# Patient Record
Sex: FEMALE | Race: BLACK OR AFRICAN AMERICAN | NOT HISPANIC OR LATINO
[De-identification: names, ages, dates, MRNs, and addresses within clinical notes are randomized per-mention and may not be internally consistent; named-entity substitution may affect disease eponyms.]

---

## 2017-03-08 DIAGNOSIS — Z79.01 LONG TERM (CURRENT) USE OF ANTICOAGULANTS: ICD-10-CM

## 2017-03-08 RX ORDER — FLUTICASONE PROPIONATE 50 UG/1
50 SPRAY, METERED NASAL
Refills: 0 | Status: ACTIVE | COMMUNITY

## 2017-03-08 RX ORDER — ATORVASTATIN CALCIUM 40 MG/1
40 TABLET, FILM COATED ORAL
Refills: 0 | Status: ACTIVE | COMMUNITY

## 2017-03-08 RX ORDER — ALBUTEROL 90 MCG
90 AEROSOL (GRAM) INHALATION
Refills: 0 | Status: ACTIVE | COMMUNITY

## 2017-03-23 ENCOUNTER — APPOINTMENT (OUTPATIENT)
Dept: INTERNAL MEDICINE | Facility: CLINIC | Age: 82
End: 2017-03-23

## 2017-05-22 ENCOUNTER — APPOINTMENT (OUTPATIENT)
Dept: GASTROENTEROLOGY | Facility: CLINIC | Age: 82
End: 2017-05-22

## 2017-06-27 ENCOUNTER — APPOINTMENT (OUTPATIENT)
Dept: GASTROENTEROLOGY | Facility: CLINIC | Age: 82
End: 2017-06-27

## 2018-03-14 ENCOUNTER — APPOINTMENT (OUTPATIENT)
Dept: ORTHOPEDIC SURGERY | Facility: CLINIC | Age: 83
End: 2018-03-14

## 2018-05-17 ENCOUNTER — OUTPATIENT (OUTPATIENT)
Dept: OUTPATIENT SERVICES | Facility: HOSPITAL | Age: 83
LOS: 1 days | Discharge: HOME | End: 2018-05-17

## 2018-05-18 LAB
GLUCOSE BLDC GLUCOMTR-MCNC: 81 MG/DL — SIGNIFICANT CHANGE UP (ref 70–99)
GLUCOSE BLDC GLUCOMTR-MCNC: 88 MG/DL — SIGNIFICANT CHANGE UP (ref 70–99)

## 2019-07-11 ENCOUNTER — OUTPATIENT (OUTPATIENT)
Dept: OUTPATIENT SERVICES | Facility: HOSPITAL | Age: 84
LOS: 1 days | Discharge: HOME | End: 2019-07-11

## 2020-07-13 ENCOUNTER — APPOINTMENT (OUTPATIENT)
Dept: HEART AND VASCULAR | Facility: CLINIC | Age: 85
End: 2020-07-13
Payer: MEDICARE

## 2020-07-13 PROCEDURE — 99443: CPT

## 2020-07-25 RX ORDER — LOSARTAN POTASSIUM 100 MG/1
100 TABLET, FILM COATED ORAL
Refills: 0 | Status: DISCONTINUED | COMMUNITY
End: 2020-07-25

## 2020-07-25 RX ORDER — METFORMIN HYDROCHLORIDE 500 MG/1
500 TABLET, COATED ORAL
Refills: 0 | Status: DISCONTINUED | COMMUNITY
End: 2020-07-25

## 2020-07-25 RX ORDER — TRIAMTERENE AND HYDROCHLOROTHIAZIDE 37.5; 25 MG/1; MG/1
37.5-25 CAPSULE ORAL
Refills: 0 | Status: DISCONTINUED | COMMUNITY
End: 2020-07-25

## 2020-07-25 RX ORDER — METOPROLOL SUCCINATE 50 MG/1
50 TABLET, EXTENDED RELEASE ORAL
Refills: 0 | Status: DISCONTINUED | COMMUNITY
End: 2020-07-25

## 2020-07-25 RX ORDER — OXYBUTYNIN CHLORIDE 5 MG/1
5 TABLET ORAL
Refills: 0 | Status: DISCONTINUED | COMMUNITY
End: 2020-07-25

## 2020-07-25 RX ORDER — APIXABAN 2.5 MG/1
2.5 TABLET, FILM COATED ORAL
Qty: 60 | Refills: 5 | Status: ACTIVE | COMMUNITY
Start: 2020-07-25

## 2020-07-25 RX ORDER — FUROSEMIDE 40 MG/1
40 TABLET ORAL
Refills: 0 | Status: DISCONTINUED | COMMUNITY
End: 2020-07-25

## 2020-07-25 RX ORDER — ASPIRIN 81 MG
81 TABLET, DELAYED RELEASE (ENTERIC COATED) ORAL
Refills: 0 | Status: DISCONTINUED | COMMUNITY
End: 2020-07-25

## 2020-07-25 RX ORDER — AMLODIPINE BESYLATE 5 MG/1
5 TABLET ORAL DAILY
Qty: 30 | Refills: 0 | Status: ACTIVE | COMMUNITY
Start: 2020-07-25

## 2020-07-25 RX ORDER — WARFARIN 5 MG/1
5 TABLET ORAL
Refills: 0 | Status: DISCONTINUED | COMMUNITY
End: 2020-07-25

## 2020-07-25 NOTE — DISCUSSION/SUMMARY
[FreeTextEntry1] : Ms. Medina is an 85 year-old female with diastolic heart failure, hypertension, pulmonary hypertension, pulmonary fibrosis, hyperlipidemia, sleep apnea, and persistent atrial fibrillation who suffers from profound WEN and PND.  It is unclear to me how much her symptoms can be attributed to her AF and how much can be stems from her heart failure and pulmonary fibrosis.  I have asked to consider DC cardioversion to evaluate for improvement in NSR.  She remains reluctant and states she is "fine the way she is".  She is agreeable to see me in person so we can discuss her options further along with obtaining an EKG to ascertain her level of rate control .

## 2020-07-25 NOTE — REVIEW OF SYSTEMS
[Dyspnea on exertion] : dyspnea during exertion [Chest Pain] : no chest pain [Shortness Of Breath] : no shortness of breath [Palpitations] : no palpitations [Cough] : cough [Lower Ext Edema] : lower extremity edema [Negative] : Genitourinary

## 2020-07-25 NOTE — HISTORY OF PRESENT ILLNESS
[FreeTextEntry1] : Ms. Medina is an 85 year-old female with diastolic heart failure, hypertension, pulmonary hypertension, pulmonary fibrosis, hyperlipidemia, sleep apnea, and persistent atrial fibrillation who presents today to discuss the merits of a rhythm control strategy.  She is unclear when she was first diagnosed with atrial fibrillation and has never had a cardioversion or ablation.  She denies any palpitations, chest pain, SOB, or syncope.  However, she does endorse significant WEN and is able to ambulate only one block with the aid of a walker.  She states she is limited by dyspnea and also chronic right hip pain.  In addition, she endorses PND and mild orthopnea.  She denies any history of stroke and has been maintained on Eliquis 2.5mg twice daily for stroke prophylaxis.  There have been no bleeding issues.

## 2020-07-25 NOTE — REASON FOR VISIT
[Medical Office: ([facility])___] : at the medical office located in  [Verbal consent obtained from patient] : the patient, [unfilled] [Home] : at home, [unfilled] , at the time of the visit.

## 2020-09-14 ENCOUNTER — APPOINTMENT (OUTPATIENT)
Dept: VASCULAR SURGERY | Facility: CLINIC | Age: 85
End: 2020-09-14
Payer: MEDICARE

## 2020-09-14 PROCEDURE — 29580 STRAPPING UNNA BOOT: CPT | Mod: LT

## 2020-09-14 PROCEDURE — 99203 OFFICE O/P NEW LOW 30 MIN: CPT | Mod: 25

## 2020-09-15 NOTE — REVIEW OF SYSTEMS
[Lower Ext Edema] : lower extremity edema [Limb Swelling] : limb swelling [As Noted in HPI] : as noted in HPI [Negative] : Heme/Lymph [Fever] : no fever [Chills] : no chills [Leg Claudication] : no intermittent leg claudication

## 2020-09-15 NOTE — HISTORY OF PRESENT ILLNESS
[FreeTextEntry1] : 84 yo F with multiple medical issues including CHF, on diuretic who was referred by her PCP to be evaluated for bilateral LE edema and LLE wiping ulcers. Patient states that she developed ulcerations 2 months ago and they haven't been healing since then. She has a visiting nurse who applies bacitracin and wraps her leg. She denies claudication, fever, chills, pain in her legs. Patient ambulates slowly with a walker.

## 2020-09-15 NOTE — PHYSICAL EXAM
[Normal Breath Sounds] : Normal breath sounds [Normal Rate and Rhythm] : normal rate and rhythm [2+] : left 2+ [Ankle Swelling (On Exam)] : present [Ankle Swelling On The Left] : moderate [Alert] : alert [] : bilaterally [Calm] : calm [JVD] : no jugular venous distention  [Varicose Veins Of Lower Extremities] : not present [Abdomen Tenderness] : ~T ~M No abdominal tenderness [de-identified] : WN/WD, NAD [de-identified] : supple [de-identified] : NC/AT [de-identified] : LLE: + hyperkeratotic changes due to venous stasis, + ulcer posterior calf, draining clear fluid [de-identified] : grossly intact

## 2020-09-15 NOTE — ASSESSMENT
[Ulcer Care] : ulcer care [FreeTextEntry1] : 84 yo F with multiple medical issues including CHF, on diuretic who was referred by her PCP to be evaluated for bilateral LE edema and LLE wiping ulcers.\par Venous stasis ulce and bilateral legs edema, L>R, no signs of infection.\par Patient was recommended unna boot that was strapped in the office.\par She will have wound care by [facility].\par F/u in 4 weeks.

## 2021-01-15 ENCOUNTER — APPOINTMENT (OUTPATIENT)
Dept: VASCULAR SURGERY | Facility: CLINIC | Age: 86
End: 2021-01-15

## 2023-11-09 ENCOUNTER — INPATIENT (INPATIENT)
Facility: HOSPITAL | Age: 88
LOS: 5 days | Discharge: HOME | DRG: 291 | End: 2023-11-15
Attending: STUDENT IN AN ORGANIZED HEALTH CARE EDUCATION/TRAINING PROGRAM | Admitting: HOSPITALIST
Payer: MEDICARE

## 2023-11-09 VITALS
SYSTOLIC BLOOD PRESSURE: 131 MMHG | DIASTOLIC BLOOD PRESSURE: 53 MMHG | TEMPERATURE: 98 F | HEART RATE: 70 BPM | HEIGHT: 60 IN | RESPIRATION RATE: 18 BRPM | OXYGEN SATURATION: 95 % | WEIGHT: 201.94 LBS

## 2023-11-09 DIAGNOSIS — I10 ESSENTIAL (PRIMARY) HYPERTENSION: ICD-10-CM

## 2023-11-09 DIAGNOSIS — Z90.710 ACQUIRED ABSENCE OF BOTH CERVIX AND UTERUS: Chronic | ICD-10-CM

## 2023-11-09 DIAGNOSIS — E11.9 TYPE 2 DIABETES MELLITUS WITHOUT COMPLICATIONS: ICD-10-CM

## 2023-11-09 DIAGNOSIS — N90.7 VULVAR CYST: ICD-10-CM

## 2023-11-09 DIAGNOSIS — I50.9 HEART FAILURE, UNSPECIFIED: ICD-10-CM

## 2023-11-09 DIAGNOSIS — J44.9 CHRONIC OBSTRUCTIVE PULMONARY DISEASE, UNSPECIFIED: ICD-10-CM

## 2023-11-09 DIAGNOSIS — I48.91 UNSPECIFIED ATRIAL FIBRILLATION: ICD-10-CM

## 2023-11-09 DIAGNOSIS — E78.5 HYPERLIPIDEMIA, UNSPECIFIED: ICD-10-CM

## 2023-11-09 DIAGNOSIS — N39.0 URINARY TRACT INFECTION, SITE NOT SPECIFIED: ICD-10-CM

## 2023-11-09 DIAGNOSIS — S81.809A UNSPECIFIED OPEN WOUND, UNSPECIFIED LOWER LEG, INITIAL ENCOUNTER: ICD-10-CM

## 2023-11-09 DIAGNOSIS — Z87.19 PERSONAL HISTORY OF OTHER DISEASES OF THE DIGESTIVE SYSTEM: Chronic | ICD-10-CM

## 2023-11-09 LAB
ALBUMIN SERPL ELPH-MCNC: 3.4 G/DL — SIGNIFICANT CHANGE UP (ref 3.3–5)
ALBUMIN SERPL ELPH-MCNC: 3.4 G/DL — SIGNIFICANT CHANGE UP (ref 3.3–5)
ALP SERPL-CCNC: 129 U/L — HIGH (ref 40–120)
ALP SERPL-CCNC: 129 U/L — HIGH (ref 40–120)
ALT FLD-CCNC: 14 U/L — SIGNIFICANT CHANGE UP (ref 10–45)
ALT FLD-CCNC: 14 U/L — SIGNIFICANT CHANGE UP (ref 10–45)
ANION GAP SERPL CALC-SCNC: 13 MMOL/L — SIGNIFICANT CHANGE UP (ref 5–17)
ANION GAP SERPL CALC-SCNC: 13 MMOL/L — SIGNIFICANT CHANGE UP (ref 5–17)
APPEARANCE UR: CLEAR — SIGNIFICANT CHANGE UP
APPEARANCE UR: CLEAR — SIGNIFICANT CHANGE UP
AST SERPL-CCNC: 22 U/L — SIGNIFICANT CHANGE UP (ref 10–40)
AST SERPL-CCNC: 22 U/L — SIGNIFICANT CHANGE UP (ref 10–40)
BACTERIA # UR AUTO: ABNORMAL /HPF
BACTERIA # UR AUTO: ABNORMAL /HPF
BASOPHILS # BLD AUTO: 0.03 K/UL — SIGNIFICANT CHANGE UP (ref 0–0.2)
BASOPHILS # BLD AUTO: 0.03 K/UL — SIGNIFICANT CHANGE UP (ref 0–0.2)
BASOPHILS NFR BLD AUTO: 0.5 % — SIGNIFICANT CHANGE UP (ref 0–2)
BASOPHILS NFR BLD AUTO: 0.5 % — SIGNIFICANT CHANGE UP (ref 0–2)
BILIRUB SERPL-MCNC: 0.8 MG/DL — SIGNIFICANT CHANGE UP (ref 0.2–1.2)
BILIRUB SERPL-MCNC: 0.8 MG/DL — SIGNIFICANT CHANGE UP (ref 0.2–1.2)
BILIRUB UR-MCNC: NEGATIVE — SIGNIFICANT CHANGE UP
BILIRUB UR-MCNC: NEGATIVE — SIGNIFICANT CHANGE UP
BUN SERPL-MCNC: 48 MG/DL — HIGH (ref 7–23)
BUN SERPL-MCNC: 48 MG/DL — HIGH (ref 7–23)
CALCIUM SERPL-MCNC: 9.5 MG/DL — SIGNIFICANT CHANGE UP (ref 8.4–10.5)
CALCIUM SERPL-MCNC: 9.5 MG/DL — SIGNIFICANT CHANGE UP (ref 8.4–10.5)
CAST: 5 /LPF — HIGH (ref 0–4)
CAST: 5 /LPF — HIGH (ref 0–4)
CHLORIDE SERPL-SCNC: 105 MMOL/L — SIGNIFICANT CHANGE UP (ref 96–108)
CHLORIDE SERPL-SCNC: 105 MMOL/L — SIGNIFICANT CHANGE UP (ref 96–108)
CO2 SERPL-SCNC: 26 MMOL/L — SIGNIFICANT CHANGE UP (ref 22–31)
CO2 SERPL-SCNC: 26 MMOL/L — SIGNIFICANT CHANGE UP (ref 22–31)
COLOR SPEC: YELLOW — SIGNIFICANT CHANGE UP
COLOR SPEC: YELLOW — SIGNIFICANT CHANGE UP
CREAT SERPL-MCNC: 1.43 MG/DL — HIGH (ref 0.5–1.3)
CREAT SERPL-MCNC: 1.43 MG/DL — HIGH (ref 0.5–1.3)
DIFF PNL FLD: NEGATIVE — SIGNIFICANT CHANGE UP
DIFF PNL FLD: NEGATIVE — SIGNIFICANT CHANGE UP
EGFR: 36 ML/MIN/1.73M2 — LOW
EGFR: 36 ML/MIN/1.73M2 — LOW
EOSINOPHIL # BLD AUTO: 0.09 K/UL — SIGNIFICANT CHANGE UP (ref 0–0.5)
EOSINOPHIL # BLD AUTO: 0.09 K/UL — SIGNIFICANT CHANGE UP (ref 0–0.5)
EOSINOPHIL NFR BLD AUTO: 1.4 % — SIGNIFICANT CHANGE UP (ref 0–6)
EOSINOPHIL NFR BLD AUTO: 1.4 % — SIGNIFICANT CHANGE UP (ref 0–6)
GLUCOSE SERPL-MCNC: 111 MG/DL — HIGH (ref 70–99)
GLUCOSE SERPL-MCNC: 111 MG/DL — HIGH (ref 70–99)
GLUCOSE UR QL: NEGATIVE MG/DL — SIGNIFICANT CHANGE UP
GLUCOSE UR QL: NEGATIVE MG/DL — SIGNIFICANT CHANGE UP
HCT VFR BLD CALC: 35.3 % — SIGNIFICANT CHANGE UP (ref 34.5–45)
HCT VFR BLD CALC: 35.3 % — SIGNIFICANT CHANGE UP (ref 34.5–45)
HGB BLD-MCNC: 11.2 G/DL — LOW (ref 11.5–15.5)
HGB BLD-MCNC: 11.2 G/DL — LOW (ref 11.5–15.5)
IMM GRANULOCYTES NFR BLD AUTO: 0.5 % — SIGNIFICANT CHANGE UP (ref 0–0.9)
IMM GRANULOCYTES NFR BLD AUTO: 0.5 % — SIGNIFICANT CHANGE UP (ref 0–0.9)
KETONES UR-MCNC: NEGATIVE MG/DL — SIGNIFICANT CHANGE UP
KETONES UR-MCNC: NEGATIVE MG/DL — SIGNIFICANT CHANGE UP
LEUKOCYTE ESTERASE UR-ACNC: ABNORMAL
LEUKOCYTE ESTERASE UR-ACNC: ABNORMAL
LYMPHOCYTES # BLD AUTO: 0.85 K/UL — LOW (ref 1–3.3)
LYMPHOCYTES # BLD AUTO: 0.85 K/UL — LOW (ref 1–3.3)
LYMPHOCYTES # BLD AUTO: 13.5 % — SIGNIFICANT CHANGE UP (ref 13–44)
LYMPHOCYTES # BLD AUTO: 13.5 % — SIGNIFICANT CHANGE UP (ref 13–44)
MAGNESIUM SERPL-MCNC: 1.6 MG/DL — SIGNIFICANT CHANGE UP (ref 1.6–2.6)
MAGNESIUM SERPL-MCNC: 1.6 MG/DL — SIGNIFICANT CHANGE UP (ref 1.6–2.6)
MCHC RBC-ENTMCNC: 31.4 PG — SIGNIFICANT CHANGE UP (ref 27–34)
MCHC RBC-ENTMCNC: 31.4 PG — SIGNIFICANT CHANGE UP (ref 27–34)
MCHC RBC-ENTMCNC: 31.7 GM/DL — LOW (ref 32–36)
MCHC RBC-ENTMCNC: 31.7 GM/DL — LOW (ref 32–36)
MCV RBC AUTO: 98.9 FL — SIGNIFICANT CHANGE UP (ref 80–100)
MCV RBC AUTO: 98.9 FL — SIGNIFICANT CHANGE UP (ref 80–100)
MONOCYTES # BLD AUTO: 0.75 K/UL — SIGNIFICANT CHANGE UP (ref 0–0.9)
MONOCYTES # BLD AUTO: 0.75 K/UL — SIGNIFICANT CHANGE UP (ref 0–0.9)
MONOCYTES NFR BLD AUTO: 11.9 % — SIGNIFICANT CHANGE UP (ref 2–14)
MONOCYTES NFR BLD AUTO: 11.9 % — SIGNIFICANT CHANGE UP (ref 2–14)
NEUTROPHILS # BLD AUTO: 4.54 K/UL — SIGNIFICANT CHANGE UP (ref 1.8–7.4)
NEUTROPHILS # BLD AUTO: 4.54 K/UL — SIGNIFICANT CHANGE UP (ref 1.8–7.4)
NEUTROPHILS NFR BLD AUTO: 72.2 % — SIGNIFICANT CHANGE UP (ref 43–77)
NEUTROPHILS NFR BLD AUTO: 72.2 % — SIGNIFICANT CHANGE UP (ref 43–77)
NITRITE UR-MCNC: NEGATIVE — SIGNIFICANT CHANGE UP
NITRITE UR-MCNC: NEGATIVE — SIGNIFICANT CHANGE UP
NRBC # BLD: 0 /100 WBCS — SIGNIFICANT CHANGE UP (ref 0–0)
NRBC # BLD: 0 /100 WBCS — SIGNIFICANT CHANGE UP (ref 0–0)
NT-PROBNP SERPL-SCNC: 4862 PG/ML — HIGH (ref 0–300)
NT-PROBNP SERPL-SCNC: 4862 PG/ML — HIGH (ref 0–300)
PH UR: 5.5 — SIGNIFICANT CHANGE UP (ref 5–8)
PH UR: 5.5 — SIGNIFICANT CHANGE UP (ref 5–8)
PLATELET # BLD AUTO: 165 K/UL — SIGNIFICANT CHANGE UP (ref 150–400)
PLATELET # BLD AUTO: 165 K/UL — SIGNIFICANT CHANGE UP (ref 150–400)
POTASSIUM SERPL-MCNC: 3.8 MMOL/L — SIGNIFICANT CHANGE UP (ref 3.5–5.3)
POTASSIUM SERPL-MCNC: 3.8 MMOL/L — SIGNIFICANT CHANGE UP (ref 3.5–5.3)
POTASSIUM SERPL-SCNC: 3.8 MMOL/L — SIGNIFICANT CHANGE UP (ref 3.5–5.3)
POTASSIUM SERPL-SCNC: 3.8 MMOL/L — SIGNIFICANT CHANGE UP (ref 3.5–5.3)
PROT SERPL-MCNC: 8.2 G/DL — SIGNIFICANT CHANGE UP (ref 6–8.3)
PROT SERPL-MCNC: 8.2 G/DL — SIGNIFICANT CHANGE UP (ref 6–8.3)
PROT UR-MCNC: NEGATIVE MG/DL — SIGNIFICANT CHANGE UP
PROT UR-MCNC: NEGATIVE MG/DL — SIGNIFICANT CHANGE UP
RBC # BLD: 3.57 M/UL — LOW (ref 3.8–5.2)
RBC # BLD: 3.57 M/UL — LOW (ref 3.8–5.2)
RBC # FLD: 14.6 % — HIGH (ref 10.3–14.5)
RBC # FLD: 14.6 % — HIGH (ref 10.3–14.5)
RBC CASTS # UR COMP ASSIST: 1 /HPF — SIGNIFICANT CHANGE UP (ref 0–4)
RBC CASTS # UR COMP ASSIST: 1 /HPF — SIGNIFICANT CHANGE UP (ref 0–4)
SODIUM SERPL-SCNC: 144 MMOL/L — SIGNIFICANT CHANGE UP (ref 135–145)
SODIUM SERPL-SCNC: 144 MMOL/L — SIGNIFICANT CHANGE UP (ref 135–145)
SP GR SPEC: 1.01 — SIGNIFICANT CHANGE UP (ref 1–1.03)
SP GR SPEC: 1.01 — SIGNIFICANT CHANGE UP (ref 1–1.03)
SQUAMOUS # UR AUTO: 4 /HPF — SIGNIFICANT CHANGE UP (ref 0–5)
SQUAMOUS # UR AUTO: 4 /HPF — SIGNIFICANT CHANGE UP (ref 0–5)
TROPONIN T, HIGH SENSITIVITY RESULT: 60 NG/L — CRITICAL HIGH (ref 0–51)
TROPONIN T, HIGH SENSITIVITY RESULT: 60 NG/L — CRITICAL HIGH (ref 0–51)
UROBILINOGEN FLD QL: 0.2 MG/DL — SIGNIFICANT CHANGE UP (ref 0.2–1)
UROBILINOGEN FLD QL: 0.2 MG/DL — SIGNIFICANT CHANGE UP (ref 0.2–1)
WBC # BLD: 6.29 K/UL — SIGNIFICANT CHANGE UP (ref 3.8–10.5)
WBC # BLD: 6.29 K/UL — SIGNIFICANT CHANGE UP (ref 3.8–10.5)
WBC # FLD AUTO: 6.29 K/UL — SIGNIFICANT CHANGE UP (ref 3.8–10.5)
WBC # FLD AUTO: 6.29 K/UL — SIGNIFICANT CHANGE UP (ref 3.8–10.5)
WBC UR QL: 44 /HPF — HIGH (ref 0–5)
WBC UR QL: 44 /HPF — HIGH (ref 0–5)

## 2023-11-09 PROCEDURE — 99223 1ST HOSP IP/OBS HIGH 75: CPT

## 2023-11-09 PROCEDURE — 99285 EMERGENCY DEPT VISIT HI MDM: CPT

## 2023-11-09 PROCEDURE — 93010 ELECTROCARDIOGRAM REPORT: CPT

## 2023-11-09 PROCEDURE — 71045 X-RAY EXAM CHEST 1 VIEW: CPT | Mod: 26

## 2023-11-09 RX ORDER — MAGNESIUM SULFATE 500 MG/ML
1 VIAL (ML) INJECTION ONCE
Refills: 0 | Status: COMPLETED | OUTPATIENT
Start: 2023-11-09 | End: 2023-11-09

## 2023-11-09 RX ORDER — POTASSIUM CHLORIDE 20 MEQ
20 PACKET (EA) ORAL ONCE
Refills: 0 | Status: COMPLETED | OUTPATIENT
Start: 2023-11-09 | End: 2023-11-09

## 2023-11-09 RX ORDER — APIXABAN 2.5 MG/1
2.5 TABLET, FILM COATED ORAL
Refills: 0 | Status: DISCONTINUED | OUTPATIENT
Start: 2023-11-09 | End: 2023-11-15

## 2023-11-09 RX ORDER — FUROSEMIDE 40 MG
40 TABLET ORAL ONCE
Refills: 0 | Status: COMPLETED | OUTPATIENT
Start: 2023-11-09 | End: 2023-11-09

## 2023-11-09 RX ORDER — FUROSEMIDE 40 MG
40 TABLET ORAL
Refills: 0 | Status: DISCONTINUED | OUTPATIENT
Start: 2023-11-09 | End: 2023-11-11

## 2023-11-09 RX ORDER — IPRATROPIUM/ALBUTEROL SULFATE 18-103MCG
3 AEROSOL WITH ADAPTER (GRAM) INHALATION EVERY 6 HOURS
Refills: 0 | Status: DISCONTINUED | OUTPATIENT
Start: 2023-11-09 | End: 2023-11-15

## 2023-11-09 RX ORDER — ACETAMINOPHEN 500 MG
1000 TABLET ORAL ONCE
Refills: 0 | Status: COMPLETED | OUTPATIENT
Start: 2023-11-09 | End: 2023-11-09

## 2023-11-09 RX ORDER — ATORVASTATIN CALCIUM 80 MG/1
40 TABLET, FILM COATED ORAL AT BEDTIME
Refills: 0 | Status: DISCONTINUED | OUTPATIENT
Start: 2023-11-09 | End: 2023-11-15

## 2023-11-09 RX ORDER — ATORVASTATIN CALCIUM 80 MG/1
1 TABLET, FILM COATED ORAL
Refills: 0 | DISCHARGE

## 2023-11-09 RX ORDER — CEFTRIAXONE 500 MG/1
1000 INJECTION, POWDER, FOR SOLUTION INTRAMUSCULAR; INTRAVENOUS ONCE
Refills: 0 | Status: COMPLETED | OUTPATIENT
Start: 2023-11-09 | End: 2023-11-09

## 2023-11-09 RX ORDER — ACETAMINOPHEN 500 MG
650 TABLET ORAL EVERY 6 HOURS
Refills: 0 | Status: DISCONTINUED | OUTPATIENT
Start: 2023-11-09 | End: 2023-11-10

## 2023-11-09 RX ORDER — ALBUTEROL 90 UG/1
2 AEROSOL, METERED ORAL EVERY 6 HOURS
Refills: 0 | Status: DISCONTINUED | OUTPATIENT
Start: 2023-11-09 | End: 2023-11-09

## 2023-11-09 RX ORDER — APIXABAN 2.5 MG/1
1 TABLET, FILM COATED ORAL
Refills: 0 | DISCHARGE

## 2023-11-09 RX ADMIN — APIXABAN 2.5 MILLIGRAM(S): 2.5 TABLET, FILM COATED ORAL at 17:57

## 2023-11-09 RX ADMIN — Medication 1 TABLET(S): at 17:57

## 2023-11-09 RX ADMIN — Medication 650 MILLIGRAM(S): at 23:26

## 2023-11-09 RX ADMIN — Medication 40 MILLIGRAM(S): at 09:12

## 2023-11-09 RX ADMIN — CEFTRIAXONE 100 MILLIGRAM(S): 500 INJECTION, POWDER, FOR SOLUTION INTRAMUSCULAR; INTRAVENOUS at 09:47

## 2023-11-09 RX ADMIN — Medication 650 MILLIGRAM(S): at 17:20

## 2023-11-09 RX ADMIN — Medication 400 MILLIGRAM(S): at 09:12

## 2023-11-09 RX ADMIN — Medication 1000 MILLIGRAM(S): at 09:48

## 2023-11-09 RX ADMIN — ATORVASTATIN CALCIUM 40 MILLIGRAM(S): 80 TABLET, FILM COATED ORAL at 21:25

## 2023-11-09 RX ADMIN — Medication 40 MILLIGRAM(S): at 21:25

## 2023-11-09 RX ADMIN — Medication 100 GRAM(S): at 16:20

## 2023-11-09 RX ADMIN — Medication 3 MILLILITER(S): at 21:25

## 2023-11-09 RX ADMIN — Medication 3 MILLILITER(S): at 16:20

## 2023-11-09 RX ADMIN — Medication 20 MILLIEQUIVALENT(S): at 17:57

## 2023-11-09 RX ADMIN — Medication 1 TABLET(S): at 16:20

## 2023-11-09 RX ADMIN — Medication 650 MILLIGRAM(S): at 16:20

## 2023-11-09 NOTE — H&P ADULT - PROBLEM SELECTOR PLAN 1
Overloaded on exam, +2 B/L LE edema, BNP 4862.  -EF unknown  -TTE tomorrow once euvolemic  -s/p IV lasix 40mg x 1 in the ED  -diuresis with IV lasix 40mg BID  -Core measures  -strict I&O's, daily weight, fluid restriction Overloaded on exam, +2 B/L LE edema, BNP 4862.  -EF unknown  -CXR reveals cardiomegaly with significant pulmonary congestion  -Trop T 60 (no need to trend per Dr. Wilson)  -TTE tomorrow once euvolemic  -s/p IV lasix 40mg x 1 in the ED  -diuresis with IV lasix 40mg BID  -Core measures  -strict I&O's, daily weight, fluid restriction

## 2023-11-09 NOTE — ED PROVIDER NOTE - PATIENT'S PREFERRED PRONOUN
From: Angela Rondon  To: Dr. Shen : 4/6/2023 9:16 AM EDT  Subject: Zulema Betancourt    Good morning! Was Bartolo Alvarado see if I could get Dr. Mike Matthews to send a prescription in to [facility] in [place], for me some nicotine patches, really want to start with my  and try to quit smoking, his dr put him on patches, thought we could try together! Thank you! Her/She

## 2023-11-09 NOTE — H&P ADULT - NSICDXPASTMEDICALHX_GEN_ALL_CORE_FT
PAST MEDICAL HISTORY:  Asthma     COPD, mild     Diabetes     Hypertension      PAST MEDICAL HISTORY:  (HFpEF) heart failure with preserved ejection fraction     Asthma     COPD, mild     Diabetes     Hypertension

## 2023-11-09 NOTE — H&P ADULT - NSHPLABSRESULTS_GEN_ALL_CORE
11.2   6.29  )-----------( 165      ( 2023 08:06 )             35.3           144  |  105  |  48<H>  ----------------------------<  111<H>  3.8   |  26  |  1.43<H>    Ca    9.5      2023 08:06  Mg     1.6         TPro  8.2  /  Alb  3.4  /  TBili  0.8  /  DBili  x   /  AST  22  /  ALT  14  /  AlkPhos  129<H>                  Urinalysis Basic - ( 2023 08:06 )    Color: Yellow / Appearance: Clear / S.006 / pH: x  Gluc: 111 mg/dL / Ketone: Negative mg/dL  / Bili: Negative / Urobili: 0.2 mg/dL   Blood: x / Protein: Negative mg/dL / Nitrite: Negative   Leuk Esterase: Moderate / RBC: 1 /HPF / WBC 44 /HPF   Sq Epi: x / Non Sq Epi: 4 /HPF / Bacteria: Many /HPF

## 2023-11-09 NOTE — CONSULT NOTE ADULT - ASSESSMENT
87yo P5 with PMHx of CHF and COPD presenting with painful lymphorrhea and painful swelling on right labia, suspected vulvar cyst or abscess.      -VSS, afebrile   -Pt to be admitted to medicine team for management of CHF and lymphorrhea   -S/p 1g CTX for suspected UTI   -Vulvar cyst/abscess suspected due to acute onset and tenderness. Finding of 1-2cm fluctuant mass at 7 o'clock.    -Recommend sitz baths up to 3x/day and warm compresses   -Recommend Bactrim 1 DS tablet BID x 7d    -Pt to follow up at [facility] at [address] [place] [address] (information below) for biopsy after discharge from hospital and completion of ABX      [facility]   [address] [id number] [phone]           Leonel Patterson PGY2   D/w Dr. Kam and Katy BARILLASC

## 2023-11-09 NOTE — ED ADULT NURSE REASSESSMENT NOTE - NS ED NURSE REASSESS COMMENT FT1
Assumed care of pt. 87 y/o F with pmhx obesity, AFib on warfarin, HTN, DM2, asthma, and chronic BLE edema in ED for worsening BLE edema with pain, right shin skin tear, and labial "boil". Pt voided to the bedpan and urine sent. Pt placed on primafit. Pt ordered for labs, IV lasix, and IV tylenol but is a difficult stick. Will administer meds after USIV placement-MD Millard made aware. Pt resting comfortably in bed.

## 2023-11-09 NOTE — ED ADULT NURSE NOTE - ED STAT RN HANDOFF DETAILS
Patient stable for transfer. Transfer and plan of care discussed with patient and family member present at bedside and verbalized understanding. No acute distress noted. Safety precautions maintained. Belongings secured with granddaughter. Patient stable for transfer. Transfer and plan of care discussed with patient and family member present at bedside and verbalized understanding. No acute distress noted. Safety precautions maintained. Belongings secured with granddaughter. Patient transported safely via stretcher, on a cardiac monitor accompanied by RN and transport team.

## 2023-11-09 NOTE — ED ADULT TRIAGE NOTE - CHIEF COMPLAINT QUOTE
"I have pain to both my legs for years. But lately they have been oozing. I also have boil to my R groin that I noticed today."

## 2023-11-09 NOTE — H&P ADULT - PROBLEM SELECTOR PLAN 8
F: None  E: Replete if K<4 or Mag<2  N: DASH Diet  VTEppx: Eliquis 2.5mg BID  Dispo:  PT: -has hx of DM documented in prior records but pt denies  -f/u A1c        F: None  E: Replete if K<4 or Mag<2  N: DASH Diet  VTEppx: Eliquis 2.5mg BID  Dispo:  PT: UA +leuks, bacteria, WBC  -pt remains afebrile, asymptomatic  -f/u UCx  -continue with IV ceftriaxone 1g x 3 days (last dose 11/11/23) UA +leuks, bacteria, WBC  -pt remains afebrile, asymptomatic  -f/u UCx  -continue with Bactrim DS twice daily x 7 days

## 2023-11-09 NOTE — H&P ADULT - CARDIOVASCULAR COMMENTS
pt has B/L 2+ pitting edema on lower extremities, with diffuse desquamation of l pt has B/L 2+ pitting edema on lower extremities, with diffuse desquamation of posterior legs with pt has B/L 2+ pitting edema on lower extremities, with diffuse desquamation of posterior leg with clear drainage pt has B/L 2+ pitting edema on lower extremities, with diffuse desquamation/sloughing of skin  of posterior leg with clear drainage pt has B/L 2+ pitting edema on lower extremities, with diffuse desquamation/sloughing of skin  of bilateral posterior leg with clear drainage

## 2023-11-09 NOTE — H&P ADULT - HISTORY OF PRESENT ILLNESS
Pt is an 89yo f, h/o htn, hld, ? dm, cva, chf, copd, who p/w c/o worsening b/l leg swelling over the past 6 days with "boils" to legs and increased oozing of clear liquid from legs. + associated pain. No pus drainage, fever, chills, n/t/w of ext. Has a visiting nurse who does dressing changes and last placed dressing to legs 2d ago. Also noticed a boil to her r groin 2d ago. + chronic orthopnea which is unchanged, no sob when upright, cp, palp, cough/ uri sx's. LLE chronically bigger than RLE. Is on daily eliquis. Also on torsemide and had dose increased 2 wks ago for increasing edema to legs and abd.   89yo f, h/o htn, hld, ? dm, cva, chf, copd, who p/w c/o worsening b/l leg swelling over the past 6 days with "boils" to legs and increased oozing of clear liquid from legs. + associated pain. No pus drainage, fever, chills, n/t/w of ext. Has a visiting nurse who does dressing changes and last placed dressing to legs 2d ago. Also noticed a boil to her r groin 2d ago. + chronic orthopnea which is unchanged, no sob when upright, cp, palp, cough/ uri sx's. LLE chronically bigger than RLE. Is on daily eliquis. Also on torsemide and had dose increased 2 wks ago for increasing edema to legs and abd.    In the ED, /76 HR 90 T 97.9 SpO2 96% RR 20. . BNP 4862. Trop T notable for 60. Cr 1.43. H/H XX. EKG reveals XX.CXR reveals cardiomegaly with significant pulmonary congestion chest wall edema Pt was given IV lasix 40mg x1 in the ED.     In light of pt's risk factors and previous history of heart failure, pt is now admitted to cardiac telemetry for acute CHF exacerbation.    89yo F with PMHx of HTN, HLD, HFpEF (on torsemide), Atrial fibrillation (on Eliquis), COPD/Asthma (prior hospitalizations unknown date), prior CVA (unknown year, no residual deficits) who presents with worsening bilateral lower extremity leg swelling over the past few days with "boils" to legs that have since popped and oozed clear yellow liquid. Pt states that she has shooting pain in B/L lower extremities that occurs intermittently and it is a 10/10. Pt states she has a visiting nurse who does dressing changes and last placed dressing to legs 2 days prior. In addition, pt has endorsed worsening shortness of breath with movement and position changes. Pt endorses orthopnea at baseline but denies CP, palpitations, dizziness, headache, palpitations, nausea, vomiting, diarrhea, cough/cold symptoms, fevers, urinary symptoms or pain.     Pt also endorses that on the way to the ED, pt noticed a "ball" like mass on her labia that was painful and getting bigger in size.    In the ED, /76 HR 90 T 97.9 SpO2 96% RR 20. BNP 4862. Trop T notable for 60. Cr 1.43. H/H XX. EKG reveals XX. CXR reveals cardiomegaly with significant pulmonary congestion chest wall edema. Pt was given IV lasix 40mg x1 in the ED.     In light of pt's risk factors and previous history of heart failure, pt is now admitted to cardiac telemetry for acute CHF exacerbation.    87yo F with PMHx of HTN, HLD, HFpEF (on torsemide), Atrial fibrillation (on Eliquis), COPD/Asthma (prior hospitalizations unknown date), prior CVA (unknown year, no residual deficits) who presents with worsening bilateral lower extremity leg swelling over the past few days with "boils" to legs that have since popped and oozed clear yellow liquid. Pt states that she has shooting pain in B/L lower extremities that occurs intermittently and it is a 10/10. Pt states she has a visiting nurse who does dressing changes and last placed dressing to legs 2 days prior. In addition, pt has endorsed worsening shortness of breath with movement and position changes. Pt endorses orthopnea at baseline but denies CP, palpitations, dizziness, headache, palpitations, nausea, vomiting, diarrhea, cough/cold symptoms, fevers, urinary symptoms or pain.     Pt also endorses that on the way to the ED, pt noticed a "ball" like mass on her labia that was painful and getting bigger in size.    In the ED, /76 HR 90 T 97.9 SpO2 96% RR 20. BNP 4862. Trop T notable for 60. Cr 1.43. H/H 11.2/35.2. EKG reveals Afib with rate of 79bpm, PVCs and RBBB.  CXR reveals cardiomegaly with significant pulmonary congestion chest wall edema. Pt was given IV lasix 40mg x1 in the ED.     In light of pt's risk factors and previous history of heart failure, pt is now admitted to cardiac telemetry for acute CHF exacerbation.    87yo F with PMHx of HTN, HLD, HFpEF (on torsemide), Atrial fibrillation (on Eliquis), COPD/Asthma (prior hospitalizations unknown date), prior CVA (unknown year, no residual deficits) who presents with worsening bilateral lower extremity leg swelling over the past few days with "boils" to legs that have since popped and oozed clear yellow liquid. Pt states that she has shooting pain in B/L lower extremities that occurs intermittently and it is a 10/10. Pt states she has a visiting nurse who does dressing changes and last placed dressing to legs 2 days prior. In addition, pt has endorsed worsening shortness of breath with movement and position changes. Pt endorses orthopnea at baseline but denies CP, palpitations, dizziness, headache, palpitations, nausea, vomiting, diarrhea, cough/cold symptoms, fevers, urinary symptoms or pain.     Pt also endorses that on the way to the ED, pt noticed a "ball" like mass on her labia that was painful and getting bigger in size.    In the ED, /76 HR 90 T 97.9 SpO2 96% RR 20. BNP 4862. Trop T notable for 60. Cr 1.43. H/H 11.2/35.2. EKG reveals Afib with rate of 79bpm, PVCs and RBBB.  CXR reveals cardiomegaly with significant pulmonary congestion chest wall edema. Pt was given IV lasix 40mg x1 in the ED. UA revealed moderate leukocytes, + bacteria and +WBC.    In light of pt's risk factors and previous history of heart failure, pt is now admitted to cardiac telemetry for acute CHF exacerbation.    87yo F with PMHx of HTN, HLD, [place] [facility] Atrial fibrillation (on Eliquis), COPD/Asthma (prior hospitalizations unknown date), prior CVA (unknown year, no residual deficits) who presents with worsening bilateral lower extremity leg swelling over the past few days with "boils" to legs that have since popped and oozed clear yellow liquid. Pt states that she has shooting pain in B/L lower extremities that occurs intermittently and it is a 10/10. Pt states she has a visiting nurse who does dressing changes and last placed dressing to legs 2 days prior. In addition, pt has endorsed worsening shortness of breath with movement and position changes. Pt endorses orthopnea at baseline but denies CP, palpitations, dizziness, headache, palpitations, nausea, vomiting, diarrhea, cough/cold symptoms, fevers, urinary symptoms or pain.     Pt also endorses that on the way to the ED, pt noticed a "ball" like mass on her labia that was painful and getting bigger in size.    In the ED, /76 HR 90 T 97.9 SpO2 96% RR 20. BNP 4862. Trop T notable for 60. Cr 1.43. H/H 11.2/35.2. EKG reveals Afib with rate of 79bpm, PVCs and RBBB.  CXR reveals cardiomegaly with significant pulmonary congestion chest wall edema. Pt was given IV lasix 40mg x1 in the ED. UA revealed moderate leukocytes, + bacteria and +WBC. Pt was given IV Ceftriaxone 1g x 1 in the ED.     In light of pt's risk factors and previous history of heart failure, pt is now admitted to cardiac telemetry for acute CHF exacerbation.    89yo F with PMHx of HTN, HLD, HFpEF ([place] [facility] [place]), Atrial fibrillation (on Eliquis), COPD/Asthma (prior hospitalizations unknown date), prior CVA (unknown year, no residual deficits) who presents with worsening bilateral lower extremity leg swelling over the past few days with "boils" to legs that have since popped and oozed clear yellow liquid. Pt states that she has shooting pain in B/L lower extremities that occurs intermittently and it is a 10/10. Pt states she has a visiting nurse who does dressing changes and last placed dressing to legs 2 days prior. In addition, pt has endorsed worsening shortness of breath with movement and position changes. Pt endorses orthopnea at baseline but denies CP, palpitations, dizziness, headache, palpitations, nausea, vomiting, diarrhea, cough/cold symptoms, fevers, urinary symptoms or pain.     Pt also endorses that on the way to the ED, pt noticed a "ball" like mass on her labia that was painful and getting bigger in size.    In the ED, /76 HR 90 T 97.9 SpO2 96% RR 20. BNP 4862. Trop T notable for 60. Cr 1.43. H/H 11.2/35.2. EKG reveals Afib with rate of 79bpm, PVCs and RBBB.  CXR reveals cardiomegaly with significant pulmonary congestion. Pt was given IV lasix 40mg x1 in the ED. UA revealed moderate leukocytes, + bacteria and +WBC. Pt was given IV Ceftriaxone 1g x 1 in the ED.     In light of pt's risk factors and previous history of heart failure, pt is now admitted to cardiac telemetry for acute CHF exacerbation.    87yo F with PMHx of HTN, HLD, HFpEF ([place] [facility] [place]), Atrial fibrillation (on Eliquis), COPD/Asthma (prior hospitalizations unknown date), prior CVA (unknown year, no residual deficits) who presents with worsening bilateral lower extremity leg swelling over the past few days with "boils" to legs that have since popped and oozes (s/p thrombectomyd clear yellow liquid. Pt states that she has shooting pain in B/L lower extremities that occurs intermittently and it is a 10/10. Pt states she has a visiting nurse who does dressing changes and last placed dressing to legs 2 days prior. In addition, pt has endorsed worsening shortness of breath with movement and position changes. Pt endorses orthopnea at baseline but denies CP, palpitations, dizziness, headache, palpitations, nausea, vomiting, diarrhea, cough/cold symptoms, fevers, urinary symptoms or pain.     Pt also endorses that on the way to the ED, pt noticed a "ball" like mass on her labia that was painful and getting bigger in size.    In the ED, /76 HR 90 T 97.9 SpO2 96% RR 20. BNP 4862. Trop T notable for 60. Cr 1.43. H/H 11.2/35.2. EKG reveals Afib with rate of 79bpm, PVCs and RBBB.  CXR reveals cardiomegaly with significant pulmonary congestion. Pt was given IV lasix 40mg x1 in the ED. UA revealed moderate leukocytes, + bacteria and +WBC. Pt was given IV Ceftriaxone 1g x 1 in the ED.     In light of pt's risk factors and previous history of heart failure, pt is now admitted to cardiac telemetry for acute CHF exacerbation.    87yo F with PMHx of HTN, HLD, HFpEF [place] [facility] [place] Atrial fibrillation (on Eliquis), COPD/Asthma (prior hospitalizations unknown date), prior CVA (s/p thrombectomy, unknown year, no residual deficits) who presents with worsening bilateral lower extremity leg swelling over the past few days with "boils" to legs that have since popped and oozed clear yellow liquid. Pt states that she has shooting pain in B/L lower extremities that occurs intermittently and it is a 10/10. Pt states she has a visiting nurse who does dressing changes and last placed dressing to legs 2 days prior. In addition, pt has endorsed worsening shortness of breath with movement and position changes. Pt endorses orthopnea at baseline but denies CP, palpitations, dizziness, headache, palpitations, nausea, vomiting, diarrhea, cough/cold symptoms, fevers, urinary symptoms or pain.     Pt also endorses that on the way to the ED, pt noticed a "ball" like mass on her labia that was painful and getting bigger in size.    In the ED, /76 HR 90 T 97.9 SpO2 96% RR 20. BNP 4862. Trop T notable for 60. Cr 1.43. H/H 11.2/35.2. EKG reveals Afib with rate of 79bpm, PVCs and RBBB.  CXR reveals cardiomegaly with significant pulmonary congestion. Pt was given IV lasix 40mg x1 in the ED. UA revealed moderate leukocytes, + bacteria and +WBC. Pt was given IV Ceftriaxone 1g x 1 in the ED.     In light of pt's risk factors and previous history of heart failure, pt is now admitted to cardiac telemetry for acute CHF exacerbation.

## 2023-11-09 NOTE — ED ADULT NURSE REASSESSMENT NOTE - NS ED NURSE REASSESS COMMENT FT1
Pt c/o pain to B/L LE, team aware. Wound care nurse consulted in regards to B/L LE wounds. As per ALDO Sultana, apply telfa dressing with kerlix for protection, pt will be seen tomorrow by wound care team. Pt reports dressing provides partial relief to pain.

## 2023-11-09 NOTE — ED PROVIDER NOTE - CLINICAL SUMMARY MEDICAL DECISION MAKING FREE TEXT BOX
Impression: 89yo f, h/o htn, hld, ? dm, cva, chf, copd, who p/w c/o worsening b/l leg swelling over the past 6 days with "boils" to legs and increased oozing of clear liquid from legs. + associated pain. No pus drainage, fever, chills, n/t/w of ext. Has a visiting nurse who does dressing changes and last changed dressing to legs 2d ago. Also noticed a boil to her r groin 2d ago. + chronic orthopnea which is unchanged, no sob when upright, cp, palp, cough/ uri sx's. + exertional dyspnea. LLE chronically bigger than RLE. Pt is on daily eliquis. Also on torsemide and had dose increased 2 wks ago for increasing edema to legs and abd. Afebrile. HDS. No signs of infection to legs. No leukoytosis on labs. + mild riki. CXR showing pulm vasc congestion. BNP 4k, trop 60. Case d/w cards- will admit for iv diuresis. Case also d/w gyn regarding possible Bartholins cyst- will follow as inpt.

## 2023-11-09 NOTE — H&P ADULT - PROBLEM SELECTOR PLAN 7
UA +leuks, bacteria, WBC   -f/u   -continue with IV ceftriaxone 1g x 3 days Pt presents with multiple open, desquamated wounds on B/L lower extremities.  -denies any pus or purulent drainage  -pt afebrile (T 97.9F)  -WBC 6.29  -wound care consult to manage dressings and cover wounds Pt presents with multiple open, desquamated wounds on B/L lower extremities.  -denies any pus or purulent drainage  -pt afebrile (T 97.9F)  -WBC 6.29  -wound care consult to manage dressings and cover wounds  -PRN tylenol for pain control Pt presents with multiple open, desquamated wounds on B/L lower extremities.  -denies any pus or purulent drainage  -pt afebrile (T 97.9F)  -WBC 6.29  -wound care consult to manage dressings and cover wounds  -f/u wound culture   -PRN tylenol for pain control

## 2023-11-09 NOTE — H&P ADULT - PROBLEM SELECTOR PLAN 4
Pt reported       -daily sitz baths, warm compresses 3x daily  -outpatient follow for biopsy with [facility] Pt reported sensation of ball and pain on labia  -GYN consulted  -start Bactrim DS twice daily x 7 days   -daily sitz baths, warm compresses 3x daily  -outpatient follow for biopsy with [facility] Pt reported sensation of ball and pain on labia.  -right lower labia with well circumscribed 1-2cm fluctuant tender area   -GYN consulted  -start Bactrim DS twice daily x 7 days   -daily sitz baths, warm compresses 3x daily  -outpatient follow for biopsy with [facility]

## 2023-11-09 NOTE — H&P ADULT - NEUROLOGICAL
normal/cranial nerves II-XII intact/sensation intact normal/cranial nerves II-XII intact/sensation intact/responds to pain

## 2023-11-09 NOTE — ED ADULT NURSE NOTE - NSFALLUNIVINTERV_ED_ALL_ED
Pt rate improved and sinus at this time with LBB. Pt on bipap. States that she is feeling much better and breathing improved. Bed/Stretcher in lowest position, wheels locked, appropriate side rails in place/Call bell, personal items and telephone in reach/Instruct patient to call for assistance before getting out of bed/chair/stretcher/Non-slip footwear applied when patient is off stretcher/[place] to call system/Physically safe environment - no spills, clutter or unnecessary equipment/Purposeful proactive rounding/Room/bathroom lighting operational, light cord in reach

## 2023-11-09 NOTE — CONSULT NOTE ADULT - SUBJECTIVE AND OBJECTIVE BOX
87yo P5 with PMHx of CHF, COPD, [place] Palsy, stroke x 2 presenting today for worsening SOB, LE edema and pain, and painful swelling on right labia. Pt is stable in ED, medicine/cardiology consulted for workup and management of SOB and edema. GYN consulted for evaluation of right labial swelling. The patient reports that she first noticed the swelling this morning and that it has been increasing in size. She was having shooting pains down her legs and came to the ED. She states that he labia is painful to touch, she is unsure if it is oozing/bleeding. She has never had a cyst before or a similar pain. She states that she bump feels hard. Pt denies fever, chills, chest pain, abdominal pain, nausea, vomiting, vaginal bleeding      OB/GYN Hx:  x 5 uncomplicated, eldest child 65yo  Denies hx of abnormal pap smears but unsure last time saw GYN  Hx of abdominal myomectomy for fibroids  Denies hx of stds    PMHx: CHF, COPD, [place] Palsy, stroke x 2 (s/p thrombectomy x 1)  SHx: abdominal hysterectomy, l/s cholecystectomy   Meds: torsemide, eliquis, and atorvastatin   Allergies:     Social hx:     PHYSICAL EXAM:   Vital Signs Last 24 Hrs  T(C): 36.6 (2023 09:00), Max: 36.9 (2023 05:55)  T(F): 97.9 (2023 09:00), Max: 98.4 (2023 05:55)  HR: 90 (2023 09:00) (70 - 90)  BP: 147/76 (2023 09:00) (131/53 - 147/76)  BP(mean): --  RR: 20 (2023 09:00) (18 - 20)  SpO2: 96% (2023 09:00) (95% - 96%)    Parameters below as of 2023 09:00  Patient On (Oxygen Delivery Method): room air        **************************  Constitutional: Alert & Oriented x3, No acute distress  Respiratory: Clear to ausculation bilaterally; no wheezing, rhonchi, or crackles  Cardiovascular: regular rate and rhythm, no murmurs, or gallops  Gastrointestinal: soft, non tender, positive bowel sounds, no rebound or guarding   Pelvic exam:   Extremities: no calf tenderness or swelling    LABS:                        11.2   6.29  )-----------( 165      ( 2023 08:06 )             35.3         144  |  105  |  48<H>  ----------------------------<  111<H>  3.8   |  26  |  1.43<H>    Ca    9.5      2023 08:06  Mg     1.6         TPro  8.2  /  Alb  3.4  /  TBili  0.8  /  DBili  x   /  AST  22  /  ALT  14  /  AlkPhos  129<H>        Urinalysis Basic - ( 2023 08:06 )    Color: Yellow / Appearance: Clear / S.006 / pH: x  Gluc: 111 mg/dL / Ketone: Negative mg/dL  / Bili: Negative / Urobili: 0.2 mg/dL   Blood: x / Protein: Negative mg/dL / Nitrite: Negative   Leuk Esterase: Moderate / RBC: 1 /HPF / WBC 44 /HPF   Sq Epi: x / Non Sq Epi: 4 /HPF / Bacteria: Many /HPF          RADIOLOGY & ADDITIONAL STUDIES: 89yo P5 with PMHx of CHF, COPD, [place] Palsy, stroke x 2 presenting today for worsening SOB, LE edema and pain, and painful swelling on right labia. Pt is stable in ED, medicine/cardiology consulted for workup and management of SOB and edema. GYN consulted for evaluation of right labial swelling. The patient reports that she first noticed the swelling this morning and that it has been increasing in size. She was having shooting pains down her legs and came to the ED. She states that he labia is painful to touch, she is unsure if it is oozing/bleeding. She has never had a cyst before or a similar pain. She states that she bump feels hard. Pt denies fever, chills, chest pain, abdominal pain, nausea, vomiting, vaginal bleeding.    OB/GYN Hx:  x 5 uncomplicated, eldest child 65yo   Denies hx of abnormal pap smears but unsure last time saw GYN   Hx of abdominal myomectomy for fibroids   Denies hx of stds     PMHx: CHF, COPD, [place] Palsy, stroke x 2 (s/p thrombectomy x 1)   SHx: abdominal hysterectomy, l/s cholecystectomy    Meds: torsemide, eliquis, and atorvastatin    Allergies:    -Surgical tape (blisters)   -Morphine/percoset (hallucinations)   -Erythromycin (anaphylaxis)   -Contrast (anaphylaxis)   -Iodine (blisters)       Social hx: Pt lives with her 2 granddaughters in [place].            PHYSICAL EXAM:      Vital Signs Last 24 Hrs     T(C): 36.6 (2023 09:00), Max: 36.9 (2023 05:55)     T(F): 97.9 (2023 09:00), Max: 98.4 (2023 05:55)     HR: 90 (2023 09:00) (70 - 90)     BP: 147/76 (2023 09:00) (131/53 - 147/76)     RR: 20 (2023 09:00) (18 - 20)     SpO2: 96% (2023 09:00) (95% - 96%)           Parameters below as of 2023 09:00     Patient On (Oxygen Delivery Method): room air                       **************************     Constitutional: No acute distress, appears uncomfortable     Respiratory: Breathing well on RA     Gastrointestinal: soft, non tender     Pelvic exam: External genital exam completed. Right lower labia with well circumscribed 1-2cm fluctuant tender area     Extremities: bilateral lower extremity edema , extensive lymphorrhea with sloughing of skin           LABS:                              11.2      6.29  )-----------( 165      ( 2023 08:06 )                35.3                       144  |  105  |  48<H>     ----------------------------<  111<H>     3.8   |  26  |  1.43<H>           Ca    9.5      2023 08:06     Mg     1.6                TPro  8.2  /  Alb  3.4  /  TBili  0.8  /  DBili  x   /  AST  22  /  ALT  14  /  AlkPhos  129<H>                   Urinalysis Basic - ( 2023 08:06 )           Color: Yellow / Appearance: Clear / S.006 / pH: x     Gluc: 111 mg/dL / Ketone: Negative mg/dL  / Bili: Negative / Urobili: 0.2 mg/dL      Blood: x / Protein: Negative mg/dL / Nitrite: Negative      Leuk Esterase: Moderate / RBC: 1 /HPF / WBC 44 /HPF      Sq Epi: x / Non Sq Epi: 4 /HPF / Bacteria: Many /HPF

## 2023-11-09 NOTE — ED PROVIDER NOTE - PHYSICAL EXAMINATION
VITAL SIGNS: I have reviewed nursing notes and confirm.  CONSTITUTIONAL: Well appearing, in no acute distress.   SKIN:  warm and dry, no acute rash.   HEAD:  normocephalic, atraumatic.  EYES: EOM intact; conjunctiva and sclera clear.  ENT: No nasal discharge; airway clear.   NECK: Supple.  CARD: S1, S2, Regular rate and rhythm.   RESP:  diminished bs to left lung base. No rales/ rhonchi.  ABD: Normal bowel sounds; soft; non-distended; non-tender; no guarding/ rebound.  : + induration/ swelling to r inferior labia, no fluctuance/ erythema.   EXT: + pitting edema to b/l legs, L > R (chronic as per pt), with areas of hypertrophic skin, bullae, and desquamation of skin 2/2 bullae rupture. + dopplerable dp/pt pulses b/l.   NEURO: Alert, oriented, grossly unremarkable  PSYCH: Cooperative, mood and affect appropriate.

## 2023-11-09 NOTE — ED ADULT NURSE NOTE - OBJECTIVE STATEMENT
pt c/o sharp bilateral leg pain with edema and fluid leaking. states home nurse comes to change ace wraps but leakage has increased recently. also c/o right sided boil on groin that was noted today

## 2023-11-09 NOTE — ED ADULT NURSE REASSESSMENT NOTE - NS ED NURSE REASSESS COMMENT FT1
BM X 1. Incontinent care done. Moisture barrier cream applied. Primafit in place. Turned and repositioned q 2 and PRN.

## 2023-11-09 NOTE — H&P ADULT - PROBLEM SELECTOR PLAN 9
-has hx of DM documented in prior records but pt denies hx  -f/u A1c      F: None  E: Replete if K<4 or Mag<2  N: DASH Diet  VTEppx: Eliquis 2.5mg BID  Dispo: pending clinical course

## 2023-11-09 NOTE — ED ADULT TRIAGE NOTE - ARRIVAL INFO ADDITIONAL COMMENTS
Pt denies fever, chills. Pt denies chest pain, sob. Unable to evaluate ble in triage, both legs wrapped in ACE bandage.

## 2023-11-09 NOTE — H&P ADULT - PROBLEM SELECTOR PLAN 6
Pt has hx   -home meds: albuterol 90mcg  -continue with albuterol PRN Pt has hx of COPD, had 1 prior hospitalization but unknown year  -B/L wheezes on exam  -home meds: albuterol PRN  -start duonebs q 6 hours

## 2023-11-09 NOTE — H&P ADULT - PROBLEM SELECTOR PLAN 2
history of Afib, on eliquis 2.5  -current HR 80s-90s  -AC with Eliquis 2.5mg twice daily  -initiate rate control agents once patient is euvolemic  -f/u TSH   -Echo ordered, f/u results history of Afib, on eliquis 2.5  -EKG reveals Afib with rate of 79bpm, PVCs and RBBB  -current HR 80s-90s  -AC with Eliquis 2.5mg twice daily  -initiate rate control agents once patient is euvolemic  -f/u TSH   -Echo ordered, f/u results

## 2023-11-09 NOTE — ED PROVIDER NOTE - OBJECTIVE STATEMENT
Pt is an 89yo f, h/o htn, hld, ? dm, cva, chf, copd, who p/w c/o worsening b/l leg swelling over the past 6 days with "boils" to legs and increased oozing of clear liquid from legs. + associated pain. No pus drainage, fever, chills, n/t/w of ext. Has a visiting nurse who does dressing changes and last placed dressing to legs 2d ago. Also noticed a boil to her r groin 2d ago. + chronic orthopnea which is unchanged, no sob when upright, cp, palp, cough/ uri sx's. LLE chronically bigger than RLE. Is on daily eliquis. Also on torsemide and had dose increased 2 wks ago for increasing edema to legs and abd. Pt is an 89yo f, h/o htn, hld, ? dm, cva, chf, copd, who p/w c/o worsening b/l leg swelling over the past 6 days with "boils" to legs and increased oozing of clear liquid from legs. + associated pain. No pus drainage, fever, chills, n/t/w of ext. Has a visiting nurse who does dressing changes and last changed dressing to legs 2d ago. Also noticed a boil to her r groin 2d ago. + chronic orthopnea which is unchanged, no sob when upright, cp, palp, cough/ uri sx's. + exertional dyspnea. LLE chronically bigger than RLE. Pt is on daily eliquis. Also on torsemide and had dose increased 2 wks ago for increasing edema to legs and abd.

## 2023-11-09 NOTE — H&P ADULT - PROBLEM SELECTOR PLAN 3
Pt has hx of HTN.  home meds: stopped amlodipine 5mg   -continue to monitor Pt has hx of HTN.  /76  home meds: stopped amlodipine 5mg   -continue to monitor Pt has hx of HTN.  -/76  -home meds: stopped amlodipine 5mg   -continue to monitor

## 2023-11-09 NOTE — PATIENT PROFILE ADULT - FALL HARM RISK - HARM RISK INTERVENTIONS

## 2023-11-09 NOTE — H&P ADULT - ASSESSMENT
89yo F with PMHx of HTN, HLD, HFpEF (previous hospitalization at [facility] for HFpeF in 3/2022), Atrial fibrillation (on Eliquis), COPD/Asthma (prior hospitalizations unknown date), prior CVA (unknown year, no residual deficits) who presents with worsening bilateral lower extremity leg swelling over the past few days with "boils" to legs that have since popped and oozed clear yellow liquid. Pt states that she has shooting pain in B/L lower extremities that occurs intermittently and it is a 10/10. Pt states she has a visiting nurse who does dressing changes and last placed dressing to legs 2 days prior. In addition, pt has endorsed worsening shortness of breath with movement and position changes.Pt also endorses that on the way to the ED, pt noticed a "ball" like mass on her labia that was painful and getting bigger in size. In the ED, /76 HR 90 T 97.9 SpO2 96% RR 20. BNP 4862. Trop T notable for 60. Cr 1.43. H/H 11.2/35.2. EKG reveals Afib with rate of 79bpm, PVCs and RBBB.  CXR reveals cardiomegaly with significant pulmonary congestion chest wall edema. Pt was given IV lasix 40mg x1 in the ED. UA revealed moderate leukocytes, + bacteria and +WBC. Pt was given IV Ceftriaxone 1g x 1 in the ED. In light of pt's risk factors and previous history of heart failure, pt is now admitted to cardiac telemetry for acute CHF exacerbation and infectious workup.  89yo F with PMHx of HTN, HLD, HFpEF (previous hospitalization at [facility] for HFpeF in 3/2022), Atrial fibrillation (on Eliquis), COPD/Asthma (prior hospitalizations unknown date), prior CVA (unknown year, no residual deficits) who presents with worsening bilateral lower extremity leg swelling over the past few days with "boils" to legs that have since popped and oozed clear yellow liquid. Pt states that she has shooting pain in B/L lower extremities that occurs intermittently and it is a 10/10. Pt states she has a visiting nurse who does dressing changes and last placed dressing to legs 2 days prior. In addition, pt has endorsed worsening shortness of breath with movement and position changes.Pt also endorses that on the way to the ED, pt noticed a "ball" like mass on her labia that was painful and getting bigger in size. In the ED, /76 HR 90 T 97.9 SpO2 96% RR 20. BNP 4862. Trop T notable for 60. Cr 1.43. H/H 11.2/35.2. EKG reveals Afib with rate of 79bpm, PVCs and RBBB.  CXR reveals cardiomegaly with significant pulmonary congestion. Pt was given IV lasix 40mg x1 in the ED. UA revealed moderate leukocytes, + bacteria and +WBC. Pt was given IV Ceftriaxone 1g x 1 in the ED. In light of pt's risk factors and previous history of heart failure, pt is now admitted to cardiac telemetry for acute CHF exacerbation and infectious workup.  89yo F with PMHx of HTN, HLD, HFpEF [place] [facility] [place]), Atrial fibrillation (on Eliquis), COPD/Asthma (prior hospitalizations unknown date), prior CVA (s/p thrombectomy, unknown year, no residual deficits) who presents with worsening bilateral lower extremity leg swelling over the past few days with "boils" to legs that have since popped and oozed clear yellow liquid. Pt states that she has shooting pain in B/L lower extremities that occurs intermittently and it is a 10/10. Pt states she has a visiting nurse who does dressing changes and last placed dressing to legs 2 days prior. In addition, pt has endorsed worsening shortness of breath with movement and position changes. Pt also endorses that on the way to the ED, pt noticed a "ball" like mass on her labia that was painful and getting bigger in size. In the ED, /76 HR 90 T 97.9 SpO2 96% RR 20. BNP 4862. Trop T notable for 60. Cr 1.43. H/H 11.2/35.2. EKG reveals Afib with rate of 79bpm, PVCs and RBBB.  CXR reveals cardiomegaly with significant pulmonary congestion. Pt was given IV lasix 40mg x1 in the ED. UA revealed moderate leukocytes, + bacteria and +WBC. Pt was given IV Ceftriaxone 1g x 1 in the ED. In light of pt's risk factors and previous history of heart failure, pt is now admitted to cardiac telemetry for acute CHF exacerbation.

## 2023-11-10 DIAGNOSIS — I50.33 ACUTE ON CHRONIC DIASTOLIC (CONGESTIVE) HEART FAILURE: ICD-10-CM

## 2023-11-10 LAB
A1C WITH ESTIMATED AVERAGE GLUCOSE RESULT: 5.2 % — SIGNIFICANT CHANGE UP (ref 4–5.6)
A1C WITH ESTIMATED AVERAGE GLUCOSE RESULT: 5.2 % — SIGNIFICANT CHANGE UP (ref 4–5.6)
ANION GAP SERPL CALC-SCNC: 12 MMOL/L — SIGNIFICANT CHANGE UP (ref 5–17)
ANION GAP SERPL CALC-SCNC: 12 MMOL/L — SIGNIFICANT CHANGE UP (ref 5–17)
BUN SERPL-MCNC: 44 MG/DL — HIGH (ref 7–23)
BUN SERPL-MCNC: 44 MG/DL — HIGH (ref 7–23)
CALCIUM SERPL-MCNC: 8.9 MG/DL — SIGNIFICANT CHANGE UP (ref 8.4–10.5)
CALCIUM SERPL-MCNC: 8.9 MG/DL — SIGNIFICANT CHANGE UP (ref 8.4–10.5)
CHLORIDE SERPL-SCNC: 105 MMOL/L — SIGNIFICANT CHANGE UP (ref 96–108)
CHLORIDE SERPL-SCNC: 105 MMOL/L — SIGNIFICANT CHANGE UP (ref 96–108)
CHOLEST SERPL-MCNC: 96 MG/DL — SIGNIFICANT CHANGE UP
CHOLEST SERPL-MCNC: 96 MG/DL — SIGNIFICANT CHANGE UP
CK MB CFR SERPL CALC: 1.4 NG/ML — SIGNIFICANT CHANGE UP (ref 0–6.7)
CK MB CFR SERPL CALC: 1.4 NG/ML — SIGNIFICANT CHANGE UP (ref 0–6.7)
CK SERPL-CCNC: 88 U/L — SIGNIFICANT CHANGE UP (ref 25–170)
CK SERPL-CCNC: 88 U/L — SIGNIFICANT CHANGE UP (ref 25–170)
CO2 SERPL-SCNC: 24 MMOL/L — SIGNIFICANT CHANGE UP (ref 22–31)
CO2 SERPL-SCNC: 24 MMOL/L — SIGNIFICANT CHANGE UP (ref 22–31)
CREAT SERPL-MCNC: 1.66 MG/DL — HIGH (ref 0.5–1.3)
CREAT SERPL-MCNC: 1.66 MG/DL — HIGH (ref 0.5–1.3)
EGFR: 30 ML/MIN/1.73M2 — LOW
EGFR: 30 ML/MIN/1.73M2 — LOW
ESTIMATED AVERAGE GLUCOSE: 103 MG/DL — SIGNIFICANT CHANGE UP (ref 68–114)
ESTIMATED AVERAGE GLUCOSE: 103 MG/DL — SIGNIFICANT CHANGE UP (ref 68–114)
GLUCOSE SERPL-MCNC: 117 MG/DL — HIGH (ref 70–99)
GLUCOSE SERPL-MCNC: 117 MG/DL — HIGH (ref 70–99)
HCT VFR BLD CALC: 37.9 % — SIGNIFICANT CHANGE UP (ref 34.5–45)
HCT VFR BLD CALC: 37.9 % — SIGNIFICANT CHANGE UP (ref 34.5–45)
HDLC SERPL-MCNC: 48 MG/DL — LOW
HDLC SERPL-MCNC: 48 MG/DL — LOW
HGB BLD-MCNC: 11.2 G/DL — LOW (ref 11.5–15.5)
HGB BLD-MCNC: 11.2 G/DL — LOW (ref 11.5–15.5)
LIPID PNL WITH DIRECT LDL SERPL: 37 MG/DL — SIGNIFICANT CHANGE UP
LIPID PNL WITH DIRECT LDL SERPL: 37 MG/DL — SIGNIFICANT CHANGE UP
MAGNESIUM SERPL-MCNC: 1.9 MG/DL — SIGNIFICANT CHANGE UP (ref 1.6–2.6)
MAGNESIUM SERPL-MCNC: 1.9 MG/DL — SIGNIFICANT CHANGE UP (ref 1.6–2.6)
MCHC RBC-ENTMCNC: 29.6 GM/DL — LOW (ref 32–36)
MCHC RBC-ENTMCNC: 29.6 GM/DL — LOW (ref 32–36)
MCHC RBC-ENTMCNC: 31.2 PG — SIGNIFICANT CHANGE UP (ref 27–34)
MCHC RBC-ENTMCNC: 31.2 PG — SIGNIFICANT CHANGE UP (ref 27–34)
MCV RBC AUTO: 105.6 FL — HIGH (ref 80–100)
MCV RBC AUTO: 105.6 FL — HIGH (ref 80–100)
NON HDL CHOLESTEROL: 48 MG/DL — SIGNIFICANT CHANGE UP
NON HDL CHOLESTEROL: 48 MG/DL — SIGNIFICANT CHANGE UP
NRBC # BLD: 0 /100 WBCS — SIGNIFICANT CHANGE UP (ref 0–0)
NRBC # BLD: 0 /100 WBCS — SIGNIFICANT CHANGE UP (ref 0–0)
PLATELET # BLD AUTO: 112 K/UL — LOW (ref 150–400)
PLATELET # BLD AUTO: 112 K/UL — LOW (ref 150–400)
POTASSIUM SERPL-MCNC: 3.7 MMOL/L — SIGNIFICANT CHANGE UP (ref 3.5–5.3)
POTASSIUM SERPL-MCNC: 3.7 MMOL/L — SIGNIFICANT CHANGE UP (ref 3.5–5.3)
POTASSIUM SERPL-SCNC: 3.7 MMOL/L — SIGNIFICANT CHANGE UP (ref 3.5–5.3)
POTASSIUM SERPL-SCNC: 3.7 MMOL/L — SIGNIFICANT CHANGE UP (ref 3.5–5.3)
RBC # BLD: 3.59 M/UL — LOW (ref 3.8–5.2)
RBC # BLD: 3.59 M/UL — LOW (ref 3.8–5.2)
RBC # FLD: 14.8 % — HIGH (ref 10.3–14.5)
RBC # FLD: 14.8 % — HIGH (ref 10.3–14.5)
SODIUM SERPL-SCNC: 141 MMOL/L — SIGNIFICANT CHANGE UP (ref 135–145)
SODIUM SERPL-SCNC: 141 MMOL/L — SIGNIFICANT CHANGE UP (ref 135–145)
T4 AB SER-ACNC: 7.1 UG/DL — SIGNIFICANT CHANGE UP (ref 4.5–11.7)
T4 AB SER-ACNC: 7.1 UG/DL — SIGNIFICANT CHANGE UP (ref 4.5–11.7)
TRIGL SERPL-MCNC: 57 MG/DL — SIGNIFICANT CHANGE UP
TRIGL SERPL-MCNC: 57 MG/DL — SIGNIFICANT CHANGE UP
TROPONIN T, HIGH SENSITIVITY RESULT: 70 NG/L — CRITICAL HIGH (ref 0–51)
TROPONIN T, HIGH SENSITIVITY RESULT: 70 NG/L — CRITICAL HIGH (ref 0–51)
TSH SERPL-MCNC: 0.26 UIU/ML — LOW (ref 0.27–4.2)
TSH SERPL-MCNC: 0.26 UIU/ML — LOW (ref 0.27–4.2)
WBC # BLD: 4.87 K/UL — SIGNIFICANT CHANGE UP (ref 3.8–10.5)
WBC # BLD: 4.87 K/UL — SIGNIFICANT CHANGE UP (ref 3.8–10.5)
WBC # FLD AUTO: 4.87 K/UL — SIGNIFICANT CHANGE UP (ref 3.8–10.5)
WBC # FLD AUTO: 4.87 K/UL — SIGNIFICANT CHANGE UP (ref 3.8–10.5)

## 2023-11-10 PROCEDURE — 99233 SBSQ HOSP IP/OBS HIGH 50: CPT

## 2023-11-10 PROCEDURE — 93306 TTE W/DOPPLER COMPLETE: CPT | Mod: 26

## 2023-11-10 RX ORDER — ACETAMINOPHEN 500 MG
975 TABLET ORAL EVERY 4 HOURS
Refills: 0 | Status: DISCONTINUED | OUTPATIENT
Start: 2023-11-10 | End: 2023-11-10

## 2023-11-10 RX ORDER — POTASSIUM CHLORIDE 20 MEQ
40 PACKET (EA) ORAL ONCE
Refills: 0 | Status: COMPLETED | OUTPATIENT
Start: 2023-11-10 | End: 2023-11-10

## 2023-11-10 RX ORDER — ACETAMINOPHEN 500 MG
650 TABLET ORAL ONCE
Refills: 0 | Status: COMPLETED | OUTPATIENT
Start: 2023-11-10 | End: 2023-11-10

## 2023-11-10 RX ORDER — ACETAMINOPHEN 500 MG
975 TABLET ORAL EVERY 6 HOURS
Refills: 0 | Status: DISCONTINUED | OUTPATIENT
Start: 2023-11-10 | End: 2023-11-11

## 2023-11-10 RX ADMIN — Medication 1 TABLET(S): at 17:32

## 2023-11-10 RX ADMIN — Medication 100 MILLIGRAM(S): at 18:07

## 2023-11-10 RX ADMIN — Medication 3 MILLILITER(S): at 05:35

## 2023-11-10 RX ADMIN — Medication 975 MILLIGRAM(S): at 17:22

## 2023-11-10 RX ADMIN — Medication 40 MILLIEQUIVALENT(S): at 15:48

## 2023-11-10 RX ADMIN — Medication 40 MILLIGRAM(S): at 09:27

## 2023-11-10 RX ADMIN — Medication 975 MILLIGRAM(S): at 23:45

## 2023-11-10 RX ADMIN — APIXABAN 2.5 MILLIGRAM(S): 2.5 TABLET, FILM COATED ORAL at 05:34

## 2023-11-10 RX ADMIN — ATORVASTATIN CALCIUM 40 MILLIGRAM(S): 80 TABLET, FILM COATED ORAL at 21:43

## 2023-11-10 RX ADMIN — Medication 650 MILLIGRAM(S): at 10:27

## 2023-11-10 RX ADMIN — Medication 1 TABLET(S): at 13:11

## 2023-11-10 RX ADMIN — APIXABAN 2.5 MILLIGRAM(S): 2.5 TABLET, FILM COATED ORAL at 17:32

## 2023-11-10 RX ADMIN — Medication 3 MILLILITER(S): at 13:11

## 2023-11-10 RX ADMIN — Medication 650 MILLIGRAM(S): at 05:34

## 2023-11-10 RX ADMIN — Medication 40 MILLIGRAM(S): at 21:42

## 2023-11-10 RX ADMIN — Medication 975 MILLIGRAM(S): at 22:50

## 2023-11-10 RX ADMIN — Medication 650 MILLIGRAM(S): at 00:00

## 2023-11-10 RX ADMIN — Medication 650 MILLIGRAM(S): at 09:27

## 2023-11-10 RX ADMIN — Medication 3 MILLILITER(S): at 17:32

## 2023-11-10 RX ADMIN — Medication 1 TABLET(S): at 05:34

## 2023-11-10 RX ADMIN — Medication 975 MILLIGRAM(S): at 16:22

## 2023-11-10 RX ADMIN — Medication 650 MILLIGRAM(S): at 06:30

## 2023-11-10 NOTE — PROGRESS NOTE ADULT - PROBLEM SELECTOR PLAN 1
Overloaded on exam, +2 B/L LE edema, BNP 4862.  -EF unknown  -CXR reveals cardiomegaly with significant pulmonary congestion  -Trop T 60 (no need to trend per Dr. Wilson)  -TTE tomorrow once euvolemic  -s/p IV lasix 40mg x 1 in the ED  -diuresis with IV lasix 40mg BID  -Core measures  -strict I&O's, daily weight, fluid restriction Overloaded on exam, +2 B/L LE edema, +JVD. BNP 4862. hs Trop T 60.   -Follows w/ cardiologist Dr Rene Avila at [facility], reports compliance w/ Torsemide 20mg qd.  -CXR reveals cardiomegaly with significant pulmonary congestion  -Trop T 60 (no need to trend per Dr. Wilson)  -TTE 11/10/23: EF 60-65%, mildly dilated RV, severely dilated RA, mod TR/NJ, pulm HTN w/ PASP 58mmHg  -c/w diuresis with IV lasix 40mg BID  -Core measures, strict I&O's, daily weight, fluid restriction

## 2023-11-10 NOTE — PROGRESS NOTE ADULT - NS ATTEND AMEND GEN_ALL_CORE FT
88F w/ pmh of chronic HFpEF, HTN, HLD, AF on Eliquis, h/o CVA, COPD p/w acute on chronic HFpEF exacerbation    -HFpEF - acute on chronic HFpEF exacerbation - c/w Lasix 40 IV BID, monitor Is&Os  -AF - chronic AF - currently auto rate controlled AF; c/w Eliquis 2.5 BID  -ID - +UTI, lymphorrhea on right labia per Gyn - c/w Amoxicillin and Doxycycline  -DASH diet  -OOB to chair  -DVT PPx  -Dispo: Cardiac Tele     Vanessa Wilson MD

## 2023-11-10 NOTE — ADVANCED PRACTICE NURSE CONSULT - ASSESSMENT
Pt awake, alert and oriented. C/o pain to BLE. States legs frequently leak fluid "for years" but never painful. Has HHRN who applies unna boots - once or twice a week. Pt reports leg edema is much more than her baseline.     BLE: ruptured bulla with superficial clean red wound beds, small serosanguinous drainage, no erythema, mild nonpitting edema. Feet warm, dry, faint DPs. RLE wounds circumferential to upper gaitor area. LLE scattered small wounds to anterior gaitor, one 5x5cm wound to posterior gaitor.

## 2023-11-10 NOTE — PROGRESS NOTE ADULT - PROBLEM SELECTOR PLAN 2
history of Afib, on eliquis 2.5  -EKG reveals Afib with rate of 79bpm, PVCs and RBBB  -current HR 80s-90s  -AC with Eliquis 2.5mg twice daily  -initiate rate control agents once patient is euvolemic  -f/u TSH   -Echo ordered, f/u results History of Afib, takes Eliquis 2.5, not on rate control agent  -EKG reveals Afib with rate of 79bpm, PVCs and RBBB  -current Afib w/ HR 80s-90s  -c/w home Eliquis 2.5mg BID  -TSH 0.264. F/u repeat TFTs in AM

## 2023-11-10 NOTE — ADVANCED PRACTICE NURSE CONSULT - REASON FOR CONSULT
BLE wounds, present on admission    Per chart review, 89yo F with PMHx of HTN, HLD, HFpEF (previous hospitalization at [facility] for HFpeF in 3/2022), Atrial fibrillation (on Eliquis), COPD/Asthma (prior hospitalizations unknown date), prior CVA (s/p thrombectomy, unknown year, no residual deficits) who presents with worsening bilateral lower extremity leg swelling over the past few days with "boils" to legs that have since popped and oozed clear yellow liquid. Pt states that she has shooting pain in B/L lower extremities that occurs intermittently and it is a 10/10. Pt states she has a visiting nurse who does dressing changes and last placed dressing to legs 2 days prior. In addition, pt has endorsed worsening shortness of breath with movement and position changes. Pt also endorses that on the way to the ED, pt noticed a "ball" like mass on her labia that was painful and getting bigger in size. In the ED, /76 HR 90 T 97.9 SpO2 96% RR 20. BNP 4862. Trop T notable for 60. Cr 1.43. H/H 11.2/35.2. EKG reveals Afib with rate of 79bpm, PVCs and RBBB.  CXR reveals cardiomegaly with significant pulmonary congestion. Pt was given IV lasix 40mg x1 in the ED. UA revealed moderate leukocytes, + bacteria and +WBC. Pt was given IV Ceftriaxone 1g x 1 in the ED. In light of pt's risk factors and previous history of heart failure, pt is now admitted to cardiac telemetry for acute CHF exacerbation.

## 2023-11-10 NOTE — PROGRESS NOTE ADULT - PROBLEM SELECTOR PLAN 3
Pt has hx of HTN.  -/76  -home meds: stopped amlodipine 5mg   -continue to monitor Stable  -/76  -home meds: stopped amlodipine 5mg   -continue to monitor

## 2023-11-10 NOTE — PROGRESS NOTE ADULT - PROBLEM SELECTOR PLAN 9
-has hx of DM documented in prior records but pt denies hx  -f/u A1c      F: None  E: Replete if K<4 or Mag<2  N: DASH Diet  VTEppx: Eliquis 2.5mg BID  Dispo: pending clinical course -has hx of DM documented in prior records but pt denies hx  -A1c 5.2    F: None  E: Replete if K<4 or Mag<2  N: DASH Diet  VTEppx: Eliquis    Dispo: admit to cardiac tele, pending IV diuresis

## 2023-11-10 NOTE — PROGRESS NOTE ADULT - PROBLEM SELECTOR PLAN 7
Pt presents with multiple open, desquamated wounds on B/L lower extremities.  -denies any pus or purulent drainage  -pt afebrile (T 97.9F)  -WBC 6.29  -wound care consult to manage dressings and cover wounds  -f/u wound culture   -PRN tylenol for pain control Pt presents with multiple open, desquamated wounds on B/L lower extremities.  -denies any pus or purulent drainage  -Afebrile (T 97.9F)  -wound care consult to manage dressings and cover wounds  -PRN tylenol for pain control

## 2023-11-10 NOTE — PROGRESS NOTE ADULT - SUBJECTIVE AND OBJECTIVE BOX
CARDIOLOGY NP PROGRESS NOTE    Subjective:   Remainder ROS otherwise negative.    Overnight Events:     TELEMETRY:    EKG:      VITAL SIGNS:  T(C): 36.7 (11-10-23 @ 08:52), Max: 36.8 (11-10-23 @ 05:19)  HR: 91 (11-10-23 @ 08:52) (80 - 109)  BP: 114/63 (11-10-23 @ 08:52) (101/55 - 159/62)  RR: 18 (11-10-23 @ 08:52) (15 - 20)  SpO2: 96% (11-10-23 @ 08:52) (96% - 100%)  Wt(kg): --    I&O's Summary    09 Nov 2023 07:01  -  10 Nov 2023 07:00  --------------------------------------------------------  IN: 60 mL / OUT: 350 mL / NET: -290 mL    10 Nov 2023 07:01  -  10 Nov 2023 14:34  --------------------------------------------------------  IN: 330 mL / OUT: 500 mL / NET: -170 mL          PHYSICAL EXAM:    General: A/ox 3, No acute Distress  Neck: Supple, NO JVD  Cardiac: S1 S2, No M/R/G  Pulmonary: CTAB, Breathing unlabored, No Rhonchi/Rales/Wheezing  Abdomen: Soft, Non -tender, +BS x 4 quads  Extremities: No Rashes, No edema  Neuro: A/o x 3, No focal deficits          LABS:                          11.2   4.87  )-----------( 112      ( 10 Nov 2023 07:48 )             37.9                              11-10    141  |  105  |  44<H>  ----------------------------<  117<H>  3.7   |  24  |  1.66<H>    Ca    8.9      10 Nov 2023 07:48  Mg     1.9     11-10    TPro  8.2  /  Alb  3.4  /  TBili  0.8  /  DBili  x   /  AST  22  /  ALT  14  /  AlkPhos  129<H>  11-09    LIVER FUNCTIONS - ( 09 Nov 2023 08:06 )  Alb: 3.4 g/dL / Pro: 8.2 g/dL / ALK PHOS: 129 U/L / ALT: 14 U/L / AST: 22 U/L / GGT: x                                   CAPILLARY BLOOD GLUCOSE                Allergies:  morphine (Unknown)  macrolide antibiotics (Anaphylaxis)  adhesives (Blisters)  contrast dye (Anaphylaxis)  iodine (Blisters)  Percocet (Unknown)    MEDICATIONS  (STANDING):  albuterol/ipratropium for Nebulization 3 milliLiter(s) Nebulizer every 6 hours  apixaban 2.5 milliGRAM(s) Oral two times a day  atorvastatin 40 milliGRAM(s) Oral at bedtime  furosemide   Injectable 40 milliGRAM(s) IV Push two times a day  multivitamin 1 Tablet(s) Oral daily  potassium chloride    Tablet ER 40 milliEquivalent(s) Oral once  trimethoprim  160 mG/sulfamethoxazole 800 mG 1 Tablet(s) Oral two times a day    MEDICATIONS  (PRN):  acetaminophen     Tablet .. 650 milliGRAM(s) Oral every 6 hours PRN Severe Pain (7 - 10)        DIAGNOSTIC TESTS:        CARDIOLOGY NP PROGRESS NOTE    Subjective: Pt seen and examined at bedside. Reports feeling some SOB upon exertion. Denies chest pain, sob at rest, lightheadedness, dizziness, palpitations, fever, chills, URI symptoms, dysuria.   Remainder ROS otherwise negative.    Overnight Events: none    TELEMETRY: Afib 80-90s, occasional PVCs         VITAL SIGNS:  T(C): 36.7 (11-10-23 @ 08:52), Max: 36.8 (11-10-23 @ 05:19)  HR: 91 (11-10-23 @ 08:52) (80 - 109)  BP: 114/63 (11-10-23 @ 08:52) (101/55 - 159/62)  RR: 18 (11-10-23 @ 08:52) (15 - 20)  SpO2: 96% (11-10-23 @ 08:52) (96% - 100%)  Wt(kg): --    I&O's Summary    09 Nov 2023 07:01  -  10 Nov 2023 07:00  --------------------------------------------------------  IN: 60 mL / OUT: 350 mL / NET: -290 mL    10 Nov 2023 07:01  -  10 Nov 2023 14:34  --------------------------------------------------------  IN: 330 mL / OUT: 500 mL / NET: -170 mL          PHYSICAL EXAM:    General: A/ox 3, No acute Distress, obese  Neck: Supple, + JVD  Cardiac: S1 S2, No M/R/G  Pulmonary: Lungs w/ bibasilar crackles, Breathing unlabored on RA, No Rhonchi/Rales/Wheezing  Abdomen: Soft, Non -tender, +BS x 4 quads  Extremities: Nico LE ruptured bulla w/ Kerlix wrapping, no erythema, Nico LE 2+ edema  Neuro: A/o x 3, No focal deficits          LABS:                          11.2   4.87  )-----------( 112      ( 10 Nov 2023 07:48 )             37.9                              11-10    141  |  105  |  44<H>  ----------------------------<  117<H>  3.7   |  24  |  1.66<H>    Ca    8.9      10 Nov 2023 07:48  Mg     1.9     11-10    TPro  8.2  /  Alb  3.4  /  TBili  0.8  /  DBili  x   /  AST  22  /  ALT  14  /  AlkPhos  129<H>  11-09    LIVER FUNCTIONS - ( 09 Nov 2023 08:06 )  Alb: 3.4 g/dL / Pro: 8.2 g/dL / ALK PHOS: 129 U/L / ALT: 14 U/L / AST: 22 U/L / GGT: x                                   CAPILLARY BLOOD GLUCOSE                Allergies:  morphine (Unknown)  macrolide antibiotics (Anaphylaxis)  adhesives (Blisters)  contrast dye (Anaphylaxis)  iodine (Blisters)  Percocet (Unknown)    MEDICATIONS  (STANDING):  albuterol/ipratropium for Nebulization 3 milliLiter(s) Nebulizer every 6 hours  apixaban 2.5 milliGRAM(s) Oral two times a day  atorvastatin 40 milliGRAM(s) Oral at bedtime  furosemide   Injectable 40 milliGRAM(s) IV Push two times a day  multivitamin 1 Tablet(s) Oral daily  potassium chloride    Tablet ER 40 milliEquivalent(s) Oral once  trimethoprim  160 mG/sulfamethoxazole 800 mG 1 Tablet(s) Oral two times a day    MEDICATIONS  (PRN):  acetaminophen     Tablet .. 650 milliGRAM(s) Oral every 6 hours PRN Severe Pain (7 - 10)        DIAGNOSTIC TESTS:     < from: TTE Echo Complete w/o Contrast w/ Doppler (11.10.23 @ 11:23) >  CONCLUSIONS:     1. Normal left ventricular size and systolic function.   2. Mildly dilated right ventricular size.   3. Mildly reduced right ventricular systolic function.   4. Severely dilated right atrium.   5. Aortic sclerosis without significant stenosis.   6. Moderate tricuspid regurgitation.   7. Moderate pulmonic regurgitation.   8. Pulmonary hypertension present, pulmonary artery systolic pressure is   58 mmHg.   9. No pericardial effusion.    < end of copied text >

## 2023-11-10 NOTE — PROGRESS NOTE ADULT - ASSESSMENT
87yo F with PMHx of HTN, HLD, HFpEF (previous hospitalization at [facility] for HFpeF in 3/2022), Atrial fibrillation (on Eliquis), COPD/Asthma (prior hospitalizations unknown date), prior CVA (s/p thrombectomy, unknown year, no residual deficits) who presents with worsening bilateral lower extremity leg swelling over the past few days with "boils" to legs that have since popped and oozed clear yellow liquid. Pt states that she has shooting pain in B/L lower extremities that occurs intermittently and it is a 10/10. Pt states she has a visiting nurse who does dressing changes and last placed dressing to legs 2 days prior. In addition, pt has endorsed worsening shortness of breath with movement and position changes. Pt also endorses that on the way to the ED, pt noticed a "ball" like mass on her labia that was painful and getting bigger in size. In the ED, /76 HR 90 T 97.9 SpO2 96% RR 20. BNP 4862. Trop T notable for 60. Cr 1.43. H/H 11.2/35.2. EKG reveals Afib with rate of 79bpm, PVCs and RBBB.  CXR reveals cardiomegaly with significant pulmonary congestion. Pt was given IV lasix 40mg x1 in the ED. UA revealed moderate leukocytes, + bacteria and +WBC. Pt was given IV Ceftriaxone 1g x 1 in the ED. In light of pt's risk factors and previous history of heart failure, pt is now admitted to cardiac telemetry for acute CHF exacerbation. 89 y/o F with PMHx of HTN, HLD, HFpEF (previous hospitalization at [facility] for HFpEF in 1/2023), Atrial fibrillation (on Eliquis), COPD/Asthma (prior hospitalizations unknown date), prior CVA (s/p thrombectomy, unknown year, no residual deficits) who presents with worsening SOB w/ minimal exertion and position changes and worsening manan LE swelling over the past few days w/ ruptured blisters. BNP 4862. hs Trop T 60. CXR reveals cardiomegaly with significant pulmonary congestion.  Admitted to cardiac telemetry for acute HFpEF exacerbation, on IV diuresis. GYN following for R vulvar cyst/abscess, started Abx.

## 2023-11-10 NOTE — PROGRESS NOTE ADULT - PROBLEM SELECTOR PLAN 8
UA +leuks, bacteria, WBC  -pt remains afebrile, asymptomatic  -f/u UCx  -continue with Bactrim DS twice daily x 7 days UA +leuks, bacteria, WBC  -pt remains afebrile, asymptomatic  -UCx positive for klebsiella pneumonaie  -Start Doxycycline 100mg BID and Augmentin 875mg BID x 7 days

## 2023-11-10 NOTE — PROGRESS NOTE ADULT - PROBLEM SELECTOR PLAN 4
Pt reported sensation of ball and pain on labia.  -right lower labia with well circumscribed 1-2cm fluctuant tender area   -GYN consulted  -start Bactrim DS twice daily x 7 days   -daily sitz baths, warm compresses 3x daily  -outpatient follow for biopsy with [facility] Pt reported sensation of "ball" and pain on labia.  -right lower labia with well circumscribed 1-2cm fluctuant tender area likely vulvar cyst/abscess  -GYN consulted in ED  -D/C Bactrim DS given JAMIN  -Start Doxycycline 100mg BID and Augmentin 875mg BID x 7 days  -daily sitz baths, warm compresses 3x daily  -outpatient follow for biopsy with [facility] after discharge and completion of Abx

## 2023-11-10 NOTE — PROGRESS NOTE ADULT - PROBLEM SELECTOR PLAN 6
Pt has hx of COPD, had 1 prior hospitalization but unknown year  -B/L wheezes on exam  -home meds: albuterol PRN  -start duonebs q 6 hours Pt has hx of COPD, had 1 prior hospitalization but unknown year  -B/L wheezes on exam on admission  -home meds: albuterol PRN  -start duonebs q 6 hours

## 2023-11-10 NOTE — ADVANCED PRACTICE NURSE CONSULT - RECOMMEDATIONS
BLE: cleanse with normal saline, apply xeroform to wound beds, cover with Telfa, secure with Kerlix. Change daily.  Leg elevation and heel offloading to decrease edema and prevent pressure injuries.     Discussed with patient and primary RN.    Please reconsult as needed.    BLE: cleanse with normal saline, apply xeroform to wound beds, cover with Telfa, secure with Kerlix. Change daily.  Leg elevation and heel offloading to decrease edema and prevent pressure injuries.     Discussed with patient, primary RN and JOSÉ Multani.     Please reconsult as needed.    BLE: cleanse with normal saline, apply xeroform to wound beds, cover with Telfa, secure with Kerlix. Change daily.  Leg elevation and heel offloading to decrease edema and prevent pressure injuries.     Discussed with patient, primary RN and NP Nerissa.     Please reconsult as needed.

## 2023-11-11 LAB
-  AMPICILLIN/SULBACTAM: SIGNIFICANT CHANGE UP
-  AMPICILLIN/SULBACTAM: SIGNIFICANT CHANGE UP
-  AMPICILLIN: SIGNIFICANT CHANGE UP
-  AMPICILLIN: SIGNIFICANT CHANGE UP
-  CEFAZOLIN: SIGNIFICANT CHANGE UP
-  CEFAZOLIN: SIGNIFICANT CHANGE UP
-  CEFTRIAXONE: SIGNIFICANT CHANGE UP
-  CEFTRIAXONE: SIGNIFICANT CHANGE UP
-  CIPROFLOXACIN: SIGNIFICANT CHANGE UP
-  CIPROFLOXACIN: SIGNIFICANT CHANGE UP
-  ERTAPENEM: SIGNIFICANT CHANGE UP
-  ERTAPENEM: SIGNIFICANT CHANGE UP
-  GENTAMICIN: SIGNIFICANT CHANGE UP
-  GENTAMICIN: SIGNIFICANT CHANGE UP
-  NITROFURANTOIN: SIGNIFICANT CHANGE UP
-  NITROFURANTOIN: SIGNIFICANT CHANGE UP
-  PIPERACILLIN/TAZOBACTAM: SIGNIFICANT CHANGE UP
-  PIPERACILLIN/TAZOBACTAM: SIGNIFICANT CHANGE UP
-  TOBRAMYCIN: SIGNIFICANT CHANGE UP
-  TOBRAMYCIN: SIGNIFICANT CHANGE UP
-  TRIMETHOPRIM/SULFAMETHOXAZOLE: SIGNIFICANT CHANGE UP
-  TRIMETHOPRIM/SULFAMETHOXAZOLE: SIGNIFICANT CHANGE UP
ANION GAP SERPL CALC-SCNC: 10 MMOL/L — SIGNIFICANT CHANGE UP (ref 5–17)
ANION GAP SERPL CALC-SCNC: 10 MMOL/L — SIGNIFICANT CHANGE UP (ref 5–17)
BLD GP AB SCN SERPL QL: NEGATIVE — SIGNIFICANT CHANGE UP
BLD GP AB SCN SERPL QL: NEGATIVE — SIGNIFICANT CHANGE UP
BUN SERPL-MCNC: 46 MG/DL — HIGH (ref 7–23)
BUN SERPL-MCNC: 46 MG/DL — HIGH (ref 7–23)
CALCIUM SERPL-MCNC: 8.9 MG/DL — SIGNIFICANT CHANGE UP (ref 8.4–10.5)
CALCIUM SERPL-MCNC: 8.9 MG/DL — SIGNIFICANT CHANGE UP (ref 8.4–10.5)
CHLORIDE SERPL-SCNC: 106 MMOL/L — SIGNIFICANT CHANGE UP (ref 96–108)
CHLORIDE SERPL-SCNC: 106 MMOL/L — SIGNIFICANT CHANGE UP (ref 96–108)
CK MB CFR SERPL CALC: 1.3 NG/ML — SIGNIFICANT CHANGE UP (ref 0–6.7)
CK MB CFR SERPL CALC: 1.3 NG/ML — SIGNIFICANT CHANGE UP (ref 0–6.7)
CK SERPL-CCNC: 74 U/L — SIGNIFICANT CHANGE UP (ref 25–170)
CK SERPL-CCNC: 74 U/L — SIGNIFICANT CHANGE UP (ref 25–170)
CO2 SERPL-SCNC: 27 MMOL/L — SIGNIFICANT CHANGE UP (ref 22–31)
CO2 SERPL-SCNC: 27 MMOL/L — SIGNIFICANT CHANGE UP (ref 22–31)
CREAT ?TM UR-MCNC: 39 MG/DL — SIGNIFICANT CHANGE UP
CREAT ?TM UR-MCNC: 39 MG/DL — SIGNIFICANT CHANGE UP
CREAT SERPL-MCNC: 1.9 MG/DL — HIGH (ref 0.5–1.3)
CREAT SERPL-MCNC: 1.9 MG/DL — HIGH (ref 0.5–1.3)
CULTURE RESULTS: ABNORMAL
CULTURE RESULTS: ABNORMAL
EGFR: 25 ML/MIN/1.73M2 — LOW
EGFR: 25 ML/MIN/1.73M2 — LOW
FERRITIN SERPL-MCNC: 282 NG/ML — SIGNIFICANT CHANGE UP (ref 13–330)
FERRITIN SERPL-MCNC: 282 NG/ML — SIGNIFICANT CHANGE UP (ref 13–330)
GLUCOSE SERPL-MCNC: 91 MG/DL — SIGNIFICANT CHANGE UP (ref 70–99)
GLUCOSE SERPL-MCNC: 91 MG/DL — SIGNIFICANT CHANGE UP (ref 70–99)
HCT VFR BLD CALC: 28.3 % — LOW (ref 34.5–45)
HCT VFR BLD CALC: 28.3 % — LOW (ref 34.5–45)
HCT VFR BLD CALC: 29.6 % — LOW (ref 34.5–45)
HCT VFR BLD CALC: 29.6 % — LOW (ref 34.5–45)
HGB BLD-MCNC: 8.8 G/DL — LOW (ref 11.5–15.5)
HGB BLD-MCNC: 8.8 G/DL — LOW (ref 11.5–15.5)
HGB BLD-MCNC: 9.2 G/DL — LOW (ref 11.5–15.5)
HGB BLD-MCNC: 9.2 G/DL — LOW (ref 11.5–15.5)
IRON SATN MFR SERPL: 13 % — LOW (ref 14–50)
IRON SATN MFR SERPL: 13 % — LOW (ref 14–50)
IRON SATN MFR SERPL: 24 UG/DL — LOW (ref 30–160)
IRON SATN MFR SERPL: 24 UG/DL — LOW (ref 30–160)
LDH SERPL L TO P-CCNC: 175 U/L — SIGNIFICANT CHANGE UP (ref 50–242)
LDH SERPL L TO P-CCNC: 175 U/L — SIGNIFICANT CHANGE UP (ref 50–242)
MAGNESIUM SERPL-MCNC: 1.9 MG/DL — SIGNIFICANT CHANGE UP (ref 1.6–2.6)
MAGNESIUM SERPL-MCNC: 1.9 MG/DL — SIGNIFICANT CHANGE UP (ref 1.6–2.6)
MCHC RBC-ENTMCNC: 30.8 PG — SIGNIFICANT CHANGE UP (ref 27–34)
MCHC RBC-ENTMCNC: 31.1 GM/DL — LOW (ref 32–36)
MCV RBC AUTO: 99 FL — SIGNIFICANT CHANGE UP (ref 80–100)
METHOD TYPE: SIGNIFICANT CHANGE UP
NRBC # BLD: 0 /100 WBCS — SIGNIFICANT CHANGE UP (ref 0–0)
ORGANISM # SPEC MICROSCOPIC CNT: ABNORMAL
ORGANISM # SPEC MICROSCOPIC CNT: SIGNIFICANT CHANGE UP
ORGANISM # SPEC MICROSCOPIC CNT: SIGNIFICANT CHANGE UP
PLATELET # BLD AUTO: 172 K/UL — SIGNIFICANT CHANGE UP (ref 150–400)
PLATELET # BLD AUTO: 172 K/UL — SIGNIFICANT CHANGE UP (ref 150–400)
PLATELET # BLD AUTO: 173 K/UL — SIGNIFICANT CHANGE UP (ref 150–400)
PLATELET # BLD AUTO: 173 K/UL — SIGNIFICANT CHANGE UP (ref 150–400)
POTASSIUM SERPL-MCNC: 4.3 MMOL/L — SIGNIFICANT CHANGE UP (ref 3.5–5.3)
POTASSIUM SERPL-MCNC: 4.3 MMOL/L — SIGNIFICANT CHANGE UP (ref 3.5–5.3)
POTASSIUM SERPL-SCNC: 4.3 MMOL/L — SIGNIFICANT CHANGE UP (ref 3.5–5.3)
POTASSIUM SERPL-SCNC: 4.3 MMOL/L — SIGNIFICANT CHANGE UP (ref 3.5–5.3)
RBC # BLD: 2.86 M/UL — LOW (ref 3.8–5.2)
RBC # BLD: 2.86 M/UL — LOW (ref 3.8–5.2)
RBC # BLD: 2.99 M/UL — LOW (ref 3.8–5.2)
RBC # FLD: 14.7 % — HIGH (ref 10.3–14.5)
RBC # FLD: 14.7 % — HIGH (ref 10.3–14.5)
RBC # FLD: 14.8 % — HIGH (ref 10.3–14.5)
RBC # FLD: 14.8 % — HIGH (ref 10.3–14.5)
RETICS #: 48.4 K/UL — SIGNIFICANT CHANGE UP (ref 25–125)
RETICS #: 48.4 K/UL — SIGNIFICANT CHANGE UP (ref 25–125)
RETICS/RBC NFR: 1.6 % — SIGNIFICANT CHANGE UP (ref 0.5–2.5)
RETICS/RBC NFR: 1.6 % — SIGNIFICANT CHANGE UP (ref 0.5–2.5)
RH IG SCN BLD-IMP: POSITIVE — SIGNIFICANT CHANGE UP
RH IG SCN BLD-IMP: POSITIVE — SIGNIFICANT CHANGE UP
SODIUM SERPL-SCNC: 143 MMOL/L — SIGNIFICANT CHANGE UP (ref 135–145)
SODIUM SERPL-SCNC: 143 MMOL/L — SIGNIFICANT CHANGE UP (ref 135–145)
SODIUM UR-SCNC: 101 MMOL/L — SIGNIFICANT CHANGE UP
SODIUM UR-SCNC: 101 MMOL/L — SIGNIFICANT CHANGE UP
SPECIMEN SOURCE: SIGNIFICANT CHANGE UP
SPECIMEN SOURCE: SIGNIFICANT CHANGE UP
T3FREE SERPL-MCNC: 1.84 PG/ML — LOW (ref 2–4.4)
T3FREE SERPL-MCNC: 1.84 PG/ML — LOW (ref 2–4.4)
T4 FREE SERPL-MCNC: 1.49 NG/DL — SIGNIFICANT CHANGE UP (ref 0.93–1.7)
T4 FREE SERPL-MCNC: 1.49 NG/DL — SIGNIFICANT CHANGE UP (ref 0.93–1.7)
TIBC SERPL-MCNC: 188 UG/DL — LOW (ref 220–430)
TIBC SERPL-MCNC: 188 UG/DL — LOW (ref 220–430)
TRANSFERRIN SERPL-MCNC: 160 MG/DL — LOW (ref 200–360)
TRANSFERRIN SERPL-MCNC: 160 MG/DL — LOW (ref 200–360)
TROPONIN T, HIGH SENSITIVITY RESULT: 74 NG/L — CRITICAL HIGH (ref 0–51)
TROPONIN T, HIGH SENSITIVITY RESULT: 74 NG/L — CRITICAL HIGH (ref 0–51)
TSH SERPL-MCNC: 0.59 UIU/ML — SIGNIFICANT CHANGE UP (ref 0.27–4.2)
TSH SERPL-MCNC: 0.59 UIU/ML — SIGNIFICANT CHANGE UP (ref 0.27–4.2)
UIBC SERPL-MCNC: 164 UG/DL — SIGNIFICANT CHANGE UP (ref 110–370)
UIBC SERPL-MCNC: 164 UG/DL — SIGNIFICANT CHANGE UP (ref 110–370)
UUN UR-MCNC: 292 MG/DL — SIGNIFICANT CHANGE UP
UUN UR-MCNC: 292 MG/DL — SIGNIFICANT CHANGE UP
WBC # BLD: 5.56 K/UL — SIGNIFICANT CHANGE UP (ref 3.8–10.5)
WBC # BLD: 5.56 K/UL — SIGNIFICANT CHANGE UP (ref 3.8–10.5)
WBC # BLD: 5.62 K/UL — SIGNIFICANT CHANGE UP (ref 3.8–10.5)
WBC # BLD: 5.62 K/UL — SIGNIFICANT CHANGE UP (ref 3.8–10.5)
WBC # FLD AUTO: 5.56 K/UL — SIGNIFICANT CHANGE UP (ref 3.8–10.5)
WBC # FLD AUTO: 5.56 K/UL — SIGNIFICANT CHANGE UP (ref 3.8–10.5)
WBC # FLD AUTO: 5.62 K/UL — SIGNIFICANT CHANGE UP (ref 3.8–10.5)
WBC # FLD AUTO: 5.62 K/UL — SIGNIFICANT CHANGE UP (ref 3.8–10.5)

## 2023-11-11 PROCEDURE — 99233 SBSQ HOSP IP/OBS HIGH 50: CPT

## 2023-11-11 RX ORDER — ACETAMINOPHEN 500 MG
975 TABLET ORAL EVERY 6 HOURS
Refills: 0 | Status: DISCONTINUED | OUTPATIENT
Start: 2023-11-11 | End: 2023-11-15

## 2023-11-11 RX ORDER — TRAMADOL HYDROCHLORIDE 50 MG/1
25 TABLET ORAL ONCE
Refills: 0 | Status: DISCONTINUED | OUTPATIENT
Start: 2023-11-11 | End: 2023-11-11

## 2023-11-11 RX ORDER — TRAMADOL HYDROCHLORIDE 50 MG/1
25 TABLET ORAL EVERY 8 HOURS
Refills: 0 | Status: DISCONTINUED | OUTPATIENT
Start: 2023-11-11 | End: 2023-11-15

## 2023-11-11 RX ORDER — ACETAMINOPHEN 500 MG
1000 TABLET ORAL ONCE
Refills: 0 | Status: COMPLETED | OUTPATIENT
Start: 2023-11-11 | End: 2023-11-11

## 2023-11-11 RX ADMIN — Medication 975 MILLIGRAM(S): at 22:53

## 2023-11-11 RX ADMIN — Medication 3 MILLILITER(S): at 18:20

## 2023-11-11 RX ADMIN — TRAMADOL HYDROCHLORIDE 25 MILLIGRAM(S): 50 TABLET ORAL at 16:20

## 2023-11-11 RX ADMIN — APIXABAN 2.5 MILLIGRAM(S): 2.5 TABLET, FILM COATED ORAL at 18:20

## 2023-11-11 RX ADMIN — Medication 3 MILLILITER(S): at 23:07

## 2023-11-11 RX ADMIN — Medication 1 TABLET(S): at 18:21

## 2023-11-11 RX ADMIN — Medication 3 MILLILITER(S): at 06:15

## 2023-11-11 RX ADMIN — Medication 3 MILLILITER(S): at 01:46

## 2023-11-11 RX ADMIN — Medication 975 MILLIGRAM(S): at 23:53

## 2023-11-11 RX ADMIN — Medication 975 MILLIGRAM(S): at 06:11

## 2023-11-11 RX ADMIN — Medication 400 MILLIGRAM(S): at 12:39

## 2023-11-11 RX ADMIN — Medication 3 MILLILITER(S): at 12:18

## 2023-11-11 RX ADMIN — TRAMADOL HYDROCHLORIDE 25 MILLIGRAM(S): 50 TABLET ORAL at 15:37

## 2023-11-11 RX ADMIN — Medication 100 MILLIGRAM(S): at 06:11

## 2023-11-11 RX ADMIN — Medication 1000 MILLIGRAM(S): at 13:23

## 2023-11-11 RX ADMIN — Medication 100 MILLIGRAM(S): at 18:20

## 2023-11-11 RX ADMIN — ATORVASTATIN CALCIUM 40 MILLIGRAM(S): 80 TABLET, FILM COATED ORAL at 22:53

## 2023-11-11 RX ADMIN — Medication 40 MILLIGRAM(S): at 09:40

## 2023-11-11 RX ADMIN — APIXABAN 2.5 MILLIGRAM(S): 2.5 TABLET, FILM COATED ORAL at 06:11

## 2023-11-11 RX ADMIN — Medication 1 TABLET(S): at 06:11

## 2023-11-11 RX ADMIN — Medication 975 MILLIGRAM(S): at 07:10

## 2023-11-11 RX ADMIN — Medication 1 TABLET(S): at 12:18

## 2023-11-11 NOTE — PROGRESS NOTE ADULT - PROBLEM SELECTOR PLAN 3
Stable  -/76  -home meds: stopped amlodipine 5mg   -continue to monitor Stable -130s  -Recently stopped amlodipine 5mg as outpt  -continue to monitor    #JAMIN  Crea uptrending since admission w/ diuresis: 1.43 -> 1.66 -> 1.9. Unclear baseline  -Hold off IV Lasix for now  -Monitor BUN/Crea  -Renally dose meds. Avoid nephrotoxic agents Stable -130s  -Recently stopped amlodipine 5mg as outpt  -continue to monitor    #JAMIN  Crea uptrending since admission w/ diuresis: 1.43 -> 1.66 -> 1.9. Unclear baseline  -FEUrea 30%, prerenal etiolgy  -Hold off IV Lasix for now  -Monitor BUN/Crea  -Renally dose meds. Avoid nephrotoxic agents

## 2023-11-11 NOTE — PROGRESS NOTE ADULT - SUBJECTIVE AND OBJECTIVE BOX
CARDIOLOGY NP PROGRESS NOTE    Subjective:   Remainder ROS otherwise negative.    Overnight Events:     TELEMETRY:    EKG:      VITAL SIGNS:  T(C): 36.7 (11-11-23 @ 09:37), Max: 37.1 (11-11-23 @ 01:28)  HR: 106 (11-11-23 @ 09:37) (82 - 106)  BP: 144/72 (11-11-23 @ 09:37) (113/59 - 144/72)  RR: 19 (11-11-23 @ 09:37) (14 - 20)  SpO2: 94% (11-11-23 @ 09:37) (92% - 97%)  Wt(kg): --    I&O's Summary    10 Nov 2023 07:01  -  11 Nov 2023 07:00  --------------------------------------------------------  IN: 1020 mL / OUT: 1250 mL / NET: -230 mL    11 Nov 2023 07:01  -  11 Nov 2023 14:43  --------------------------------------------------------  IN: 280 mL / OUT: 0 mL / NET: 280 mL          PHYSICAL EXAM:    General: A/ox 3, No acute Distress  Neck: Supple, NO JVD  Cardiac: S1 S2, No M/R/G  Pulmonary: CTAB, Breathing unlabored, No Rhonchi/Rales/Wheezing  Abdomen: Soft, Non -tender, +BS x 4 quads  Extremities: No Rashes, No edema  Neuro: A/o x 3, No focal deficits          LABS:                          9.2    5.62  )-----------( 173      ( 11 Nov 2023 12:00 )             29.6                              11-11    143  |  106  |  46<H>  ----------------------------<  91  4.3   |  27  |  1.90<H>    Ca    8.9      11 Nov 2023 05:30  Mg     1.9     11-11                                CAPILLARY BLOOD GLUCOSE        CARDIAC MARKERS ( 11 Nov 2023 05:30 )  x     / x     / 74 U/L / x     / 1.3 ng/mL  CARDIAC MARKERS ( 10 Nov 2023 07:48 )  x     / x     / 88 U/L / x     / 1.4 ng/mL          Allergies:  morphine (Unknown)  macrolide antibiotics (Anaphylaxis)  adhesives (Blisters)  contrast dye (Anaphylaxis)  iodine (Blisters)  Percocet (Unknown)    MEDICATIONS  (STANDING):  albuterol/ipratropium for Nebulization 3 milliLiter(s) Nebulizer every 6 hours  amoxicillin  875 milliGRAM(s)/clavulanate 1 Tablet(s) Oral every 12 hours  apixaban 2.5 milliGRAM(s) Oral two times a day  atorvastatin 40 milliGRAM(s) Oral at bedtime  doxycycline monohydrate Capsule 100 milliGRAM(s) Oral every 12 hours  multivitamin 1 Tablet(s) Oral daily    MEDICATIONS  (PRN):  acetaminophen     Tablet .. 975 milliGRAM(s) Oral every 6 hours PRN Moderate Pain (4 - 6), Severe Pain (7 - 10)        DIAGNOSTIC TESTS:        CARDIOLOGY NP PROGRESS NOTE    Subjective: Pt seen and examined at bedside. Reports feeling nico LE pain upon movement. Denies chest pain, sob, lightheadedness, dizziness, palpitations, fever, chills. Remainder ROS otherwise negative.    Overnight Events: Bun/crea uptrending 44/1.6 to 46/1.9    TELEMETRY: Afib 80-100s      VITAL SIGNS:  T(C): 36.7 (11-11-23 @ 09:37), Max: 37.1 (11-11-23 @ 01:28)  HR: 106 (11-11-23 @ 09:37) (82 - 106)  BP: 144/72 (11-11-23 @ 09:37) (113/59 - 144/72)  RR: 19 (11-11-23 @ 09:37) (14 - 20)  SpO2: 94% (11-11-23 @ 09:37) (92% - 97%)  Wt(kg): --    I&O's Summary    10 Nov 2023 07:01  -  11 Nov 2023 07:00  --------------------------------------------------------  IN: 1020 mL / OUT: 1250 mL / NET: -230 mL    11 Nov 2023 07:01  -  11 Nov 2023 14:43  --------------------------------------------------------  IN: 280 mL / OUT: 0 mL / NET: 280 mL          PHYSICAL EXAM:    General: A/ox 3, No acute Distress, obese  Neck: Supple, + JVD  Cardiac: S1 S2, No M/R/G  Pulmonary: Lungs w/ improved bibasilar crackles, Breathing unlabored on RA, No Rhonchi/Wheezing  Abdomen: Soft, Non -tender, +BS x 4 quads  Extremities: Nico LE ruptured bulla w/ Kerlix wrapping, no erythema, Nico LE 2+ edema  Neuro: A/o x 3, No focal deficits            LABS:                          9.2    5.62  )-----------( 173      ( 11 Nov 2023 12:00 )             29.6                              11-11    143  |  106  |  46<H>  ----------------------------<  91  4.3   |  27  |  1.90<H>    Ca    8.9      11 Nov 2023 05:30  Mg     1.9     11-11                                CAPILLARY BLOOD GLUCOSE        CARDIAC MARKERS ( 11 Nov 2023 05:30 )  x     / x     / 74 U/L / x     / 1.3 ng/mL  CARDIAC MARKERS ( 10 Nov 2023 07:48 )  x     / x     / 88 U/L / x     / 1.4 ng/mL          Allergies:  morphine (Unknown)  macrolide antibiotics (Anaphylaxis)  adhesives (Blisters)  contrast dye (Anaphylaxis)  iodine (Blisters)  Percocet (Unknown)    MEDICATIONS  (STANDING):  albuterol/ipratropium for Nebulization 3 milliLiter(s) Nebulizer every 6 hours  amoxicillin  875 milliGRAM(s)/clavulanate 1 Tablet(s) Oral every 12 hours  apixaban 2.5 milliGRAM(s) Oral two times a day  atorvastatin 40 milliGRAM(s) Oral at bedtime  doxycycline monohydrate Capsule 100 milliGRAM(s) Oral every 12 hours  multivitamin 1 Tablet(s) Oral daily    MEDICATIONS  (PRN):  acetaminophen     Tablet .. 975 milliGRAM(s) Oral every 6 hours PRN Moderate Pain (4 - 6), Severe Pain (7 - 10)        DIAGNOSTIC TESTS:

## 2023-11-11 NOTE — PROGRESS NOTE ADULT - PROBLEM SELECTOR PLAN 9
-has hx of DM documented in prior records but pt denies hx  -A1c 5.2    F: None  E: Replete if K<4 or Mag<2  N: DASH Diet  VTEppx: Eliquis    Dispo: admit to cardiac tele, pending IV diuresis

## 2023-11-11 NOTE — PROGRESS NOTE ADULT - PROBLEM SELECTOR PLAN 4
Pt reported sensation of "ball" and pain on labia.  -right lower labia with well circumscribed 1-2cm fluctuant tender area likely vulvar cyst/abscess  -GYN consulted in ED  -D/C Bactrim DS given JAMIN  -Start Doxycycline 100mg BID and Augmentin 875mg BID x 7 days  -daily sitz baths, warm compresses 3x daily  -outpatient follow for biopsy with [facility] after discharge and completion of Abx Pt reported sensation of "ball" and pain on labia.  -right lower labia with well circumscribed 1-2cm fluctuant tender area likely vulvar cyst/abscess  -GYN consulted in ED  -D/C'ed Bactrim DS given JAMIN  -Started Doxycycline 100mg BID and Augmentin 875mg BID x 7 days, completes 11/16  -daily sitz baths, warm compresses 3x daily  -outpatient follow for biopsy with [facility] after discharge and completion of Abx

## 2023-11-11 NOTE — PROGRESS NOTE ADULT - PROBLEM SELECTOR PLAN 6
Pt has hx of COPD, had 1 prior hospitalization but unknown year  -B/L wheezes on exam on admission  -home meds: albuterol PRN  -start duonebs q 6 hours

## 2023-11-11 NOTE — PROGRESS NOTE ADULT - NS ATTEND AMEND GEN_ALL_CORE FT
no JVD  clear lungs  no cardiac murmur  mild leg edema bilaterally      stop lasix  IV tylenol for pain  can add low dose [id number] mg as needed if pain not under control

## 2023-11-11 NOTE — PROGRESS NOTE ADULT - ASSESSMENT
89 y/o F with PMHx of HTN, HLD, HFpEF (previous hospitalization at [facility] for HFpEF in 1/2023), Atrial fibrillation (on Eliquis), COPD/Asthma (prior hospitalizations unknown date), prior CVA (s/p thrombectomy, unknown year, no residual deficits) who presents with worsening SOB w/ minimal exertion and position changes and worsening manan LE swelling over the past few days w/ ruptured blisters. BNP 4862. hs Trop T 60. CXR reveals cardiomegaly with significant pulmonary congestion.  Admitted to cardiac telemetry for acute HFpEF exacerbation, on IV diuresis. GYN following for R vulvar cyst/abscess, started Abx. 89 y/o F with PMHx of HTN, HLD, HFpEF (previous hospitalization at [facility] for HFpEF in 1/2023), Atrial fibrillation (on Eliquis), COPD/Asthma (prior hospitalizations unknown date), prior CVA (s/p thrombectomy, unknown year, no residual deficits) who presents with worsening SOB w/ minimal exertion and position changes and worsening manan LE swelling over the past few days w/ ruptured blisters. BNP 4862. Hs Trop T 60. CXR w/ cardiomegaly with significant pulmonary congestion.  Admitted to cardiac telemetry for acute on chronic HFpEF exacerbation, s/p IV diuresis c/b JAMIN w/ crea 1.9. GYN following for R vulvar cyst/abscess, started Augmentin/Doxycycline x7days.

## 2023-11-11 NOTE — PROGRESS NOTE ADULT - PROBLEM SELECTOR PLAN 5
LDL 65  -continue with atorvastatin 40mg daily LDL 65  -continue with atorvastatin 40mg daily    #Diarrhea  -3-5 episodes of loose stool/diarrhea 11/11 per RN  -Denies abd pain, fever, or leukocytosis. Did not receive recent bowel regimens  -Obtain GI-PCR and C diff given currently on PO Abx

## 2023-11-11 NOTE — PROGRESS NOTE ADULT - PROBLEM SELECTOR PLAN 7
Pt presents with multiple open, desquamated wounds on B/L lower extremities.  -denies any pus or purulent drainage  -Afebrile (T 97.9F)  -wound care consult to manage dressings and cover wounds  -PRN tylenol for pain control Pt presents with multiple open, desquamated wounds on B/L lower extremities.  -denies any pus or purulent drainage. Afebrile, no s/s infection  -PRN Tramadol and Tylenol for pain control  -Wound care RN consulted: BLE: ruptured bulla with superficial clean red wound beds, small serosanguinous drainage, no erythema, mild nonpitting edema. Feet warm, dry, faint DPs. RLE wounds circumferential to upper gaitor area. LLE scattered small wounds to anterior gaitor, one 5x5cm wound to posterior gaitor.   -Wound care recs: cleanse with normal saline, apply xeroform to wound beds, cover with Telfa, secure with Kerlix. Change daily. Leg elevation and heel offloading to decrease edema and prevent pressure injuries.

## 2023-11-11 NOTE — PROGRESS NOTE ADULT - PROBLEM SELECTOR PLAN 1
Overloaded on exam, +2 B/L LE edema, +JVD. BNP 4862. hs Trop T 60.   -Follows w/ cardiologist Dr Rene Avila at [facility], reports compliance w/ Torsemide 20mg qd.  -CXR reveals cardiomegaly with significant pulmonary congestion  -Trop T 60 (no need to trend per Dr. Wilson)  -TTE 11/10/23: EF 60-65%, mildly dilated RV, severely dilated RA, mod TR/MT, pulm HTN w/ PASP 58mmHg  -c/w diuresis with IV lasix 40mg BID  -Core measures, strict I&O's, daily weight, fluid restriction Unclear etiology of exacerbation. Follows w/ cardiologist Dr Rene Avila at [facility], reports compliance w/ Torsemide 20mg qd.  -CXR reveals cardiomegaly with significant pulmonary congestion  - BNP 4862. Trop T 60 ->70 -> 74, likely 2/2 HF exac  -EKG w/ Afib 90s, RBBB, PVC  -TTE 11/10/23: EF 60-65%, mildly dilated RV, severely dilated RA, mod TR/OK, pulm HTN w/ PASP 58mmHg  -Hold further IV lasix 40mg BID given uptrending crea. Reassess volume status in AM  -Core measures, strict I&O's, daily weight, fluid restriction

## 2023-11-11 NOTE — PROGRESS NOTE ADULT - PROBLEM SELECTOR PLAN 8
UA +leuks, bacteria, WBC  -pt remains afebrile, asymptomatic  -UCx positive for klebsiella pneumonaie  -Start Doxycycline 100mg BID and Augmentin 875mg BID x 7 days

## 2023-11-11 NOTE — PROGRESS NOTE ADULT - PROBLEM SELECTOR PLAN 2
History of Afib, takes Eliquis 2.5, not on rate control agent  -EKG reveals Afib with rate of 79bpm, PVCs and RBBB  -current Afib w/ HR 80s-90s  -c/w home Eliquis 2.5mg BID  -TSH 0.264. F/u repeat TFTs in AM History of Afib, takes Eliquis 2.5, not on rate control agent  -EKG reveals Afib with rate of 79bpm, PVCs and RBBB  -current Afib w/ HR 80s-90s  -c/w home Eliquis 2.5mg BID  -Low TSH 0.264 on admision. Repeat TSH/FT4 WNL

## 2023-11-12 DIAGNOSIS — R19.7 DIARRHEA, UNSPECIFIED: ICD-10-CM

## 2023-11-12 LAB
ANION GAP SERPL CALC-SCNC: 10 MMOL/L — SIGNIFICANT CHANGE UP (ref 5–17)
ANION GAP SERPL CALC-SCNC: 10 MMOL/L — SIGNIFICANT CHANGE UP (ref 5–17)
ANION GAP SERPL CALC-SCNC: 13 MMOL/L — SIGNIFICANT CHANGE UP (ref 5–17)
ANION GAP SERPL CALC-SCNC: 13 MMOL/L — SIGNIFICANT CHANGE UP (ref 5–17)
BUN SERPL-MCNC: 40 MG/DL — HIGH (ref 7–23)
BUN SERPL-MCNC: 40 MG/DL — HIGH (ref 7–23)
BUN SERPL-MCNC: 42 MG/DL — HIGH (ref 7–23)
BUN SERPL-MCNC: 42 MG/DL — HIGH (ref 7–23)
CALCIUM SERPL-MCNC: 9.3 MG/DL — SIGNIFICANT CHANGE UP (ref 8.4–10.5)
CALCIUM SERPL-MCNC: 9.3 MG/DL — SIGNIFICANT CHANGE UP (ref 8.4–10.5)
CALCIUM SERPL-MCNC: 9.5 MG/DL — SIGNIFICANT CHANGE UP (ref 8.4–10.5)
CALCIUM SERPL-MCNC: 9.5 MG/DL — SIGNIFICANT CHANGE UP (ref 8.4–10.5)
CHLORIDE SERPL-SCNC: 106 MMOL/L — SIGNIFICANT CHANGE UP (ref 96–108)
CO2 SERPL-SCNC: 23 MMOL/L — SIGNIFICANT CHANGE UP (ref 22–31)
CO2 SERPL-SCNC: 23 MMOL/L — SIGNIFICANT CHANGE UP (ref 22–31)
CO2 SERPL-SCNC: 26 MMOL/L — SIGNIFICANT CHANGE UP (ref 22–31)
CO2 SERPL-SCNC: 26 MMOL/L — SIGNIFICANT CHANGE UP (ref 22–31)
CREAT SERPL-MCNC: 1.91 MG/DL — HIGH (ref 0.5–1.3)
CREAT SERPL-MCNC: 1.91 MG/DL — HIGH (ref 0.5–1.3)
CREAT SERPL-MCNC: 2.03 MG/DL — HIGH (ref 0.5–1.3)
CREAT SERPL-MCNC: 2.03 MG/DL — HIGH (ref 0.5–1.3)
EGFR: 23 ML/MIN/1.73M2 — LOW
EGFR: 23 ML/MIN/1.73M2 — LOW
EGFR: 25 ML/MIN/1.73M2 — LOW
EGFR: 25 ML/MIN/1.73M2 — LOW
FOLATE SERPL-MCNC: 14 NG/ML — SIGNIFICANT CHANGE UP
FOLATE SERPL-MCNC: 14 NG/ML — SIGNIFICANT CHANGE UP
GLUCOSE SERPL-MCNC: 141 MG/DL — HIGH (ref 70–99)
GLUCOSE SERPL-MCNC: 141 MG/DL — HIGH (ref 70–99)
GLUCOSE SERPL-MCNC: 95 MG/DL — SIGNIFICANT CHANGE UP (ref 70–99)
GLUCOSE SERPL-MCNC: 95 MG/DL — SIGNIFICANT CHANGE UP (ref 70–99)
HCT VFR BLD CALC: 31.6 % — LOW (ref 34.5–45)
HCT VFR BLD CALC: 31.6 % — LOW (ref 34.5–45)
HGB BLD-MCNC: 9.4 G/DL — LOW (ref 11.5–15.5)
HGB BLD-MCNC: 9.4 G/DL — LOW (ref 11.5–15.5)
MAGNESIUM SERPL-MCNC: 2 MG/DL — SIGNIFICANT CHANGE UP (ref 1.6–2.6)
MAGNESIUM SERPL-MCNC: 2 MG/DL — SIGNIFICANT CHANGE UP (ref 1.6–2.6)
MAGNESIUM SERPL-MCNC: 2.1 MG/DL — SIGNIFICANT CHANGE UP (ref 1.6–2.6)
MAGNESIUM SERPL-MCNC: 2.1 MG/DL — SIGNIFICANT CHANGE UP (ref 1.6–2.6)
MCHC RBC-ENTMCNC: 29.7 GM/DL — LOW (ref 32–36)
MCHC RBC-ENTMCNC: 29.7 GM/DL — LOW (ref 32–36)
MCHC RBC-ENTMCNC: 30.5 PG — SIGNIFICANT CHANGE UP (ref 27–34)
MCHC RBC-ENTMCNC: 30.5 PG — SIGNIFICANT CHANGE UP (ref 27–34)
MCV RBC AUTO: 102.6 FL — HIGH (ref 80–100)
MCV RBC AUTO: 102.6 FL — HIGH (ref 80–100)
NRBC # BLD: 0 /100 WBCS — SIGNIFICANT CHANGE UP (ref 0–0)
NRBC # BLD: 0 /100 WBCS — SIGNIFICANT CHANGE UP (ref 0–0)
PHOSPHATE SERPL-MCNC: 3.6 MG/DL — SIGNIFICANT CHANGE UP (ref 2.5–4.5)
PHOSPHATE SERPL-MCNC: 3.6 MG/DL — SIGNIFICANT CHANGE UP (ref 2.5–4.5)
PLATELET # BLD AUTO: 191 K/UL — SIGNIFICANT CHANGE UP (ref 150–400)
PLATELET # BLD AUTO: 191 K/UL — SIGNIFICANT CHANGE UP (ref 150–400)
POTASSIUM SERPL-MCNC: 4.2 MMOL/L — SIGNIFICANT CHANGE UP (ref 3.5–5.3)
POTASSIUM SERPL-MCNC: 4.2 MMOL/L — SIGNIFICANT CHANGE UP (ref 3.5–5.3)
POTASSIUM SERPL-MCNC: 4.4 MMOL/L — SIGNIFICANT CHANGE UP (ref 3.5–5.3)
POTASSIUM SERPL-MCNC: 4.4 MMOL/L — SIGNIFICANT CHANGE UP (ref 3.5–5.3)
POTASSIUM SERPL-SCNC: 4.2 MMOL/L — SIGNIFICANT CHANGE UP (ref 3.5–5.3)
POTASSIUM SERPL-SCNC: 4.2 MMOL/L — SIGNIFICANT CHANGE UP (ref 3.5–5.3)
POTASSIUM SERPL-SCNC: 4.4 MMOL/L — SIGNIFICANT CHANGE UP (ref 3.5–5.3)
POTASSIUM SERPL-SCNC: 4.4 MMOL/L — SIGNIFICANT CHANGE UP (ref 3.5–5.3)
RBC # BLD: 3.08 M/UL — LOW (ref 3.8–5.2)
RBC # BLD: 3.08 M/UL — LOW (ref 3.8–5.2)
RBC # FLD: 14.9 % — HIGH (ref 10.3–14.5)
RBC # FLD: 14.9 % — HIGH (ref 10.3–14.5)
SODIUM SERPL-SCNC: 142 MMOL/L — SIGNIFICANT CHANGE UP (ref 135–145)
VIT B12 SERPL-MCNC: 1121 PG/ML — SIGNIFICANT CHANGE UP (ref 232–1245)
VIT B12 SERPL-MCNC: 1121 PG/ML — SIGNIFICANT CHANGE UP (ref 232–1245)
WBC # BLD: 5.17 K/UL — SIGNIFICANT CHANGE UP (ref 3.8–10.5)
WBC # BLD: 5.17 K/UL — SIGNIFICANT CHANGE UP (ref 3.8–10.5)
WBC # FLD AUTO: 5.17 K/UL — SIGNIFICANT CHANGE UP (ref 3.8–10.5)
WBC # FLD AUTO: 5.17 K/UL — SIGNIFICANT CHANGE UP (ref 3.8–10.5)

## 2023-11-12 PROCEDURE — 99233 SBSQ HOSP IP/OBS HIGH 50: CPT

## 2023-11-12 RX ORDER — ACETAMINOPHEN 500 MG
1000 TABLET ORAL ONCE
Refills: 0 | Status: DISCONTINUED | OUTPATIENT
Start: 2023-11-12 | End: 2023-11-15

## 2023-11-12 RX ORDER — IRON SUCROSE 20 MG/ML
300 INJECTION, SOLUTION INTRAVENOUS EVERY 24 HOURS
Refills: 0 | Status: COMPLETED | OUTPATIENT
Start: 2023-11-12 | End: 2023-11-14

## 2023-11-12 RX ADMIN — Medication 3 MILLILITER(S): at 18:05

## 2023-11-12 RX ADMIN — APIXABAN 2.5 MILLIGRAM(S): 2.5 TABLET, FILM COATED ORAL at 06:46

## 2023-11-12 RX ADMIN — TRAMADOL HYDROCHLORIDE 25 MILLIGRAM(S): 50 TABLET ORAL at 18:06

## 2023-11-12 RX ADMIN — ATORVASTATIN CALCIUM 40 MILLIGRAM(S): 80 TABLET, FILM COATED ORAL at 21:53

## 2023-11-12 RX ADMIN — Medication 100 MILLIGRAM(S): at 06:45

## 2023-11-12 RX ADMIN — Medication 1 TABLET(S): at 12:01

## 2023-11-12 RX ADMIN — Medication 100 MILLIGRAM(S): at 18:06

## 2023-11-12 RX ADMIN — Medication 3 MILLILITER(S): at 12:01

## 2023-11-12 RX ADMIN — IRON SUCROSE 176.67 MILLIGRAM(S): 20 INJECTION, SOLUTION INTRAVENOUS at 17:07

## 2023-11-12 RX ADMIN — TRAMADOL HYDROCHLORIDE 25 MILLIGRAM(S): 50 TABLET ORAL at 19:27

## 2023-11-12 RX ADMIN — TRAMADOL HYDROCHLORIDE 25 MILLIGRAM(S): 50 TABLET ORAL at 06:44

## 2023-11-12 RX ADMIN — Medication 975 MILLIGRAM(S): at 14:32

## 2023-11-12 RX ADMIN — TRAMADOL HYDROCHLORIDE 25 MILLIGRAM(S): 50 TABLET ORAL at 07:44

## 2023-11-12 RX ADMIN — Medication 3 MILLILITER(S): at 06:46

## 2023-11-12 RX ADMIN — Medication 1 TABLET(S): at 18:06

## 2023-11-12 RX ADMIN — Medication 1 TABLET(S): at 06:46

## 2023-11-12 RX ADMIN — APIXABAN 2.5 MILLIGRAM(S): 2.5 TABLET, FILM COATED ORAL at 18:06

## 2023-11-12 RX ADMIN — Medication 975 MILLIGRAM(S): at 13:35

## 2023-11-12 NOTE — PROGRESS NOTE ADULT - PROBLEM SELECTOR PLAN 6
Pt has hx of COPD, had 1 prior hospitalization but unknown year  -B/L wheezes on exam on admission  -home meds: albuterol PRN  -start duonebs q 6 hours LDL 65  -continue with atorvastatin 40mg daily

## 2023-11-12 NOTE — PROGRESS NOTE ADULT - PROBLEM SELECTOR PLAN 9
-has hx of DM documented in prior records but pt denies hx  -A1c 5.2    F: None  E: Replete if K<4 or Mag<2  N: DASH Diet  VTEppx: Eliquis    Dispo: admit to cardiac tele, pending IV diuresis UA +leuks, bacteria, WBC  -pt remains afebrile, asymptomatic  -UCx positive for klebsiella pneumonaie  -Start Doxycycline 100mg BID and Augmentin 875mg BID x 7 days

## 2023-11-12 NOTE — PROGRESS NOTE ADULT - PROBLEM SELECTOR PROBLEM 9
[FreeTextEntry1] : 11 YEAR OLD Diabetes mellitus Acute UTI

## 2023-11-12 NOTE — PROGRESS NOTE ADULT - SUBJECTIVE AND OBJECTIVE BOX
Cardiology PA Adult Progress Note    SUBJECTIVE ASSESSMENT: Overnight no acute events; Patient laying comfortably in bed. Currently denies CP, dizziness, palpitations, SOB, dyspnea, coughing, headaches, N/V/D/abdominal pain. Patient understands plan for the day.   	  MEDICATIONS:    amoxicillin  875 milliGRAM(s)/clavulanate 1 Tablet(s) Oral every 12 hours  doxycycline monohydrate Capsule 100 milliGRAM(s) Oral every 12 hours    albuterol/ipratropium for Nebulization 3 milliLiter(s) Nebulizer every 6 hours    acetaminophen     Tablet .. 975 milliGRAM(s) Oral every 6 hours PRN  traMADol 25 milliGRAM(s) Oral every 8 hours PRN      atorvastatin 40 milliGRAM(s) Oral at bedtime    apixaban 2.5 milliGRAM(s) Oral two times a day  multivitamin 1 Tablet(s) Oral daily    	  VITAL SIGNS:  T(C): 36.3 (11-12-23 @ 05:51), Max: 36.7 (11-11-23 @ 09:37)  HR: 98 (11-12-23 @ 05:51) (84 - 126)  BP: 112/70 (11-12-23 @ 05:51) (112/70 - 144/72)  RR: 18 (11-12-23 @ 05:51) (18 - 19)  SpO2: 92% (11-12-23 @ 05:51) (92% - 95%)  Wt(kg): --    I&O's Summary    11 Nov 2023 07:01  -  12 Nov 2023 07:00  --------------------------------------------------------  IN: 520 mL / OUT: 400 mL / NET: 120 mL                                           PHYSICAL EXAM:  Appearance: Normal	  HEENT: Normal oral mucosa, PERRL, EOMI	  Neck: Supple, + JVD/ - JVD; ___ Carotid Bruit   Cardiovascular: Normal S1 S2, No murmurs  Respiratory: Lungs clear to auscultation/Decreased Breath Sounds/No Rales, Rhonchi, Wheezing	  Gastrointestinal:  Soft, Non-tender, + BS	  Skin: No rashes, No ecchymoses, No cyanosis  Extremities: Normal range of motion, No clubbing, cyanosis or edema  Vascular: Peripheral pulses palpable 2+ bilaterally  Neurologic: Non-focal  Psychiatry: A & O x 3, Mood & affect appropriate    LABS:	 	             9.2    5.62  )-----------( 173      ( 11 Nov 2023 12:00 )             29.6     11-11    143  |  106  |  46<H>  ----------------------------<  91  4.3   |  27  |  1.90<H>    Ca    8.9      11 Nov 2023 05:30  Mg     1.9     11-11      proBNP:   Lipid Profile:   HgA1c:   TSH:    Cardiology PA Adult Progress Note    SUBJECTIVE ASSESSMENT: Overnight no acute events;  Currently denies CP, dizziness, palpitations, SOB, dyspnea, coughing, headaches,   Patient understands plan for the day. Continuing to have stomach cramps; no loose stools today.   	  MEDICATIONS:    amoxicillin  875 milliGRAM(s)/clavulanate 1 Tablet(s) Oral every 12 hours  doxycycline monohydrate Capsule 100 milliGRAM(s) Oral every 12 hours    albuterol/ipratropium for Nebulization 3 milliLiter(s) Nebulizer every 6 hours    acetaminophen     Tablet .. 975 milliGRAM(s) Oral every 6 hours PRN  traMADol 25 milliGRAM(s) Oral every 8 hours PRN      atorvastatin 40 milliGRAM(s) Oral at bedtime    apixaban 2.5 milliGRAM(s) Oral two times a day  multivitamin 1 Tablet(s) Oral daily    	  VITAL SIGNS:  T(C): 36.3 (11-12-23 @ 05:51), Max: 36.7 (11-11-23 @ 09:37)  HR: 98 (11-12-23 @ 05:51) (84 - 126)  BP: 112/70 (11-12-23 @ 05:51) (112/70 - 144/72)  RR: 18 (11-12-23 @ 05:51) (18 - 19)  SpO2: 92% (11-12-23 @ 05:51) (92% - 95%)  Wt(kg): --    I&O's Summary    11 Nov 2023 07:01  -  12 Nov 2023 07:00  --------------------------------------------------------  IN: 520 mL / OUT: 400 mL / NET: 120 mL                                           PHYSICAL EXAM:  Appearance: Normal	  HEENT: Normal oral mucosa, PERRL, EOMI	  Neck: Supple,  - JVD   Cardiovascular: Normal S1 S2, No murmurs  Respiratory: Lungs clear to auscultation No Rales, Rhonchi, Wheezing	  Gastrointestinal:  Soft, Non-tender, + BS	  Skin: No rashes, No ecchymoses, No cyanosis  Extremities: Normal range of motion, No clubbing, cyanosis or edema  Vascular: Peripheral pulses palpable 2+ bilaterally  Neurologic: Non-focal  Psychiatry: A & O x 3, Mood & affect appropriate    LABS:	 	             9.2    5.62  )-----------( 173      ( 11 Nov 2023 12:00 )             29.6     11-11    143  |  106  |  46<H>  ----------------------------<  91  4.3   |  27  |  1.90<H>    Ca    8.9      11 Nov 2023 05:30  Mg     1.9     11-11      proBNP:   Lipid Profile:   HgA1c:   TSH:

## 2023-11-12 NOTE — PROGRESS NOTE ADULT - PROBLEM SELECTOR PLAN 5
LDL 65  -continue with atorvastatin 40mg daily    #Diarrhea  -3-5 episodes of loose stool/diarrhea 11/11 per RN  -Denies abd pain, fever, or leukocytosis. Did not receive recent bowel regimens  -Obtain GI-PCR and C diff given currently on PO Abx Pt reported sensation of "ball" and pain on labia.  -right lower labia with well circumscribed 1-2cm fluctuant tender area likely vulvar cyst/abscess  -GYN consulted in ED  -D/C'ed Bactrim DS given JAMIN  -Started Doxycycline 100mg BID and Augmentin 875mg BID x 7 days, completes 11/16  -daily sitz baths, warm compresses 3x daily  -outpatient follow for biopsy with Emily Lazo after discharge and completion of Abx

## 2023-11-12 NOTE — PROGRESS NOTE ADULT - PROBLEM SELECTOR PLAN 7
Pt presents with multiple open, desquamated wounds on B/L lower extremities.  -denies any pus or purulent drainage. Afebrile, no s/s infection  -PRN Tramadol and Tylenol for pain control  -Wound care RN consulted: BLE: ruptured bulla with superficial clean red wound beds, small serosanguinous drainage, no erythema, mild nonpitting edema. Feet warm, dry, faint DPs. RLE wounds circumferential to upper gaitor area. LLE scattered small wounds to anterior gaitor, one 5x5cm wound to posterior gaitor.   -Wound care recs: cleanse with normal saline, apply xeroform to wound beds, cover with Telfa, secure with Kerlix. Change daily. Leg elevation and heel offloading to decrease edema and prevent pressure injuries. Pt has hx of COPD, had 1 prior hospitalization but unknown year  -B/L wheezes on exam on admission  -home meds: albuterol PRN  -start duonebs q 6 hours

## 2023-11-12 NOTE — PROGRESS NOTE ADULT - PROBLEM SELECTOR PLAN 1
Unclear etiology of exacerbation. Follows w/ cardiologist Dr Rene Avila at [facility], reports compliance w/ Torsemide 20mg qd.  -CXR reveals cardiomegaly with significant pulmonary congestion  - BNP 4862. Trop T 60 ->70 -> 74, likely 2/2 HF exac  -EKG w/ Afib 90s, RBBB, PVC  -TTE 11/10/23: EF 60-65%, mildly dilated RV, severely dilated RA, mod TR/LA, pulm HTN w/ PASP 58mmHg  -Hold further IV lasix 40mg BID given uptrending crea. Reassess volume status in AM  -Core measures, strict I&O's, daily weight, fluid restriction

## 2023-11-12 NOTE — PROGRESS NOTE ADULT - PROBLEM SELECTOR PLAN 8
UA +leuks, bacteria, WBC  -pt remains afebrile, asymptomatic  -UCx positive for klebsiella pneumonaie  -Start Doxycycline 100mg BID and Augmentin 875mg BID x 7 days Pt presents with multiple open, desquamated wounds on B/L lower extremities.  -denies any pus or purulent drainage. Afebrile, no s/s infection  -PRN Tramadol and Tylenol for pain control  -Wound care RN consulted: BLE: ruptured bulla with superficial clean red wound beds, small serosanguinous drainage, no erythema, mild nonpitting edema. Feet warm, dry, faint DPs. RLE wounds circumferential to upper gaitor area. LLE scattered small wounds to anterior gaitor, one 5x5cm wound to posterior gaitor.   -Wound care recs: cleanse with normal saline, apply xeroform to wound beds, cover with Telfa, secure with Kerlix. Change daily. Leg elevation and heel offloading to decrease edema and prevent pressure injuries.

## 2023-11-12 NOTE — PROGRESS NOTE ADULT - PROBLEM SELECTOR PLAN 4
Pt reported sensation of "ball" and pain on labia.  -right lower labia with well circumscribed 1-2cm fluctuant tender area likely vulvar cyst/abscess  -GYN consulted in ED  -D/C'ed Bactrim DS given JAMIN  -Started Doxycycline 100mg BID and Augmentin 875mg BID x 7 days, completes 11/16  -daily sitz baths, warm compresses 3x daily  -outpatient follow for biopsy with Emily Lazo after discharge and completion of Abx Stable -130s  -Recently stopped amlodipine 5mg as outpt  -continue to monitor    #JAMIN  Crea uptrending since admission w/ diuresis: 1.43 -> 1.66 -> 1.9. Unclear baseline  -FEUrea 30%, prerenal etiolgy  -Hold off IV Lasix for now  -Monitor BUN/Crea  -Renally dose meds. Avoid nephrotoxic agents

## 2023-11-12 NOTE — PROGRESS NOTE ADULT - ASSESSMENT
87 y/o F with PMHx of HTN, HLD, HFpEF (previous hospitalization at [facility] for HFpEF in 1/2023), Atrial fibrillation (on Eliquis), COPD/Asthma (prior hospitalizations unknown date), prior CVA (s/p thrombectomy, unknown year, no residual deficits) who presents with worsening SOB w/ minimal exertion and position changes and worsening manan LE swelling over the past few days w/ ruptured blisters. BNP 4862. Hs Trop T 60. CXR w/ cardiomegaly with significant pulmonary congestion.  Admitted to cardiac telemetry for acute on chronic HFpEF exacerbation, s/p IV diuresis c/b JAMIN w/ crea 1.9. GYN following for R vulvar cyst/abscess, started Augmentin/Doxycycline x7days. Pending GI PCR for suspected CDiff infection and daily reassessment of Lasix based on renal function

## 2023-11-12 NOTE — PROGRESS NOTE ADULT - PROBLEM SELECTOR PLAN 2
History of Afib, takes Eliquis 2.5, not on rate control agent  -EKG reveals Afib with rate of 79bpm, PVCs and RBBB  -current Afib w/ HR 80s-90s  -c/w home Eliquis 2.5mg BID  -Low TSH 0.264 on admision. Repeat TSH/FT4 WNL Patient reported episodes of diarrhea without melena or hematochezia due to multiple changes of PO ABx  CDiff PCR sent out and patient placed on CDiff precautions   Prominent stomach cramps on exam only releived by IV tylenol

## 2023-11-12 NOTE — PROGRESS NOTE ADULT - NS ATTEND AMEND GEN_ALL_CORE FT
no JVD  clear lungs  no cardiac murmurs  no edema    acute on chronic CKD  repeat cr if rising will need to give a little fluid  pain control

## 2023-11-12 NOTE — PROGRESS NOTE ADULT - PROBLEM SELECTOR PLAN 3
Stable -130s  -Recently stopped amlodipine 5mg as outpt  -continue to monitor    #JAMIN  Crea uptrending since admission w/ diuresis: 1.43 -> 1.66 -> 1.9. Unclear baseline  -FEUrea 30%, prerenal etiolgy  -Hold off IV Lasix for now  -Monitor BUN/Crea  -Renally dose meds. Avoid nephrotoxic agents History of Afib, takes Eliquis 2.5, not on rate control agent  -EKG reveals Afib with rate of 79bpm, PVCs and RBBB  -current Afib w/ HR 80s-90s  -c/w home Eliquis 2.5mg BID  -Low TSH 0.264 on admision. Repeat TSH/FT4 WNL

## 2023-11-13 ENCOUNTER — TRANSCRIPTION ENCOUNTER (OUTPATIENT)
Age: 88
End: 2023-11-13

## 2023-11-13 PROBLEM — I50.30 UNSPECIFIED DIASTOLIC (CONGESTIVE) HEART FAILURE: Chronic | Status: ACTIVE | Noted: 2023-11-09

## 2023-11-13 PROBLEM — J44.9 CHRONIC OBSTRUCTIVE PULMONARY DISEASE, UNSPECIFIED: Chronic | Status: ACTIVE | Noted: 2023-11-09

## 2023-11-13 LAB
ANION GAP SERPL CALC-SCNC: 9 MMOL/L — SIGNIFICANT CHANGE UP (ref 5–17)
ANION GAP SERPL CALC-SCNC: 9 MMOL/L — SIGNIFICANT CHANGE UP (ref 5–17)
BUN SERPL-MCNC: 40 MG/DL — HIGH (ref 7–23)
BUN SERPL-MCNC: 40 MG/DL — HIGH (ref 7–23)
CALCIUM SERPL-MCNC: 9.4 MG/DL — SIGNIFICANT CHANGE UP (ref 8.4–10.5)
CALCIUM SERPL-MCNC: 9.4 MG/DL — SIGNIFICANT CHANGE UP (ref 8.4–10.5)
CHLORIDE SERPL-SCNC: 105 MMOL/L — SIGNIFICANT CHANGE UP (ref 96–108)
CHLORIDE SERPL-SCNC: 105 MMOL/L — SIGNIFICANT CHANGE UP (ref 96–108)
CO2 SERPL-SCNC: 27 MMOL/L — SIGNIFICANT CHANGE UP (ref 22–31)
CO2 SERPL-SCNC: 27 MMOL/L — SIGNIFICANT CHANGE UP (ref 22–31)
CREAT SERPL-MCNC: 1.8 MG/DL — HIGH (ref 0.5–1.3)
CREAT SERPL-MCNC: 1.8 MG/DL — HIGH (ref 0.5–1.3)
EGFR: 27 ML/MIN/1.73M2 — LOW
EGFR: 27 ML/MIN/1.73M2 — LOW
GLUCOSE SERPL-MCNC: 94 MG/DL — SIGNIFICANT CHANGE UP (ref 70–99)
GLUCOSE SERPL-MCNC: 94 MG/DL — SIGNIFICANT CHANGE UP (ref 70–99)
HCT VFR BLD CALC: 30.4 % — LOW (ref 34.5–45)
HCT VFR BLD CALC: 30.4 % — LOW (ref 34.5–45)
HGB BLD-MCNC: 9.2 G/DL — LOW (ref 11.5–15.5)
HGB BLD-MCNC: 9.2 G/DL — LOW (ref 11.5–15.5)
MAGNESIUM SERPL-MCNC: 2 MG/DL — SIGNIFICANT CHANGE UP (ref 1.6–2.6)
MAGNESIUM SERPL-MCNC: 2 MG/DL — SIGNIFICANT CHANGE UP (ref 1.6–2.6)
MCHC RBC-ENTMCNC: 30.3 GM/DL — LOW (ref 32–36)
MCHC RBC-ENTMCNC: 30.3 GM/DL — LOW (ref 32–36)
MCHC RBC-ENTMCNC: 30.5 PG — SIGNIFICANT CHANGE UP (ref 27–34)
MCHC RBC-ENTMCNC: 30.5 PG — SIGNIFICANT CHANGE UP (ref 27–34)
MCV RBC AUTO: 100.7 FL — HIGH (ref 80–100)
MCV RBC AUTO: 100.7 FL — HIGH (ref 80–100)
NRBC # BLD: 0 /100 WBCS — SIGNIFICANT CHANGE UP (ref 0–0)
NRBC # BLD: 0 /100 WBCS — SIGNIFICANT CHANGE UP (ref 0–0)
PLATELET # BLD AUTO: 204 K/UL — SIGNIFICANT CHANGE UP (ref 150–400)
PLATELET # BLD AUTO: 204 K/UL — SIGNIFICANT CHANGE UP (ref 150–400)
POTASSIUM SERPL-MCNC: 4.3 MMOL/L — SIGNIFICANT CHANGE UP (ref 3.5–5.3)
POTASSIUM SERPL-MCNC: 4.3 MMOL/L — SIGNIFICANT CHANGE UP (ref 3.5–5.3)
POTASSIUM SERPL-SCNC: 4.3 MMOL/L — SIGNIFICANT CHANGE UP (ref 3.5–5.3)
POTASSIUM SERPL-SCNC: 4.3 MMOL/L — SIGNIFICANT CHANGE UP (ref 3.5–5.3)
RBC # BLD: 3.02 M/UL — LOW (ref 3.8–5.2)
RBC # BLD: 3.02 M/UL — LOW (ref 3.8–5.2)
RBC # FLD: 14.8 % — HIGH (ref 10.3–14.5)
RBC # FLD: 14.8 % — HIGH (ref 10.3–14.5)
SODIUM SERPL-SCNC: 141 MMOL/L — SIGNIFICANT CHANGE UP (ref 135–145)
SODIUM SERPL-SCNC: 141 MMOL/L — SIGNIFICANT CHANGE UP (ref 135–145)
WBC # BLD: 4.76 K/UL — SIGNIFICANT CHANGE UP (ref 3.8–10.5)
WBC # BLD: 4.76 K/UL — SIGNIFICANT CHANGE UP (ref 3.8–10.5)
WBC # FLD AUTO: 4.76 K/UL — SIGNIFICANT CHANGE UP (ref 3.8–10.5)
WBC # FLD AUTO: 4.76 K/UL — SIGNIFICANT CHANGE UP (ref 3.8–10.5)

## 2023-11-13 PROCEDURE — 99233 SBSQ HOSP IP/OBS HIGH 50: CPT

## 2023-11-13 RX ADMIN — TRAMADOL HYDROCHLORIDE 25 MILLIGRAM(S): 50 TABLET ORAL at 06:40

## 2023-11-13 RX ADMIN — Medication 1 TABLET(S): at 05:20

## 2023-11-13 RX ADMIN — ATORVASTATIN CALCIUM 40 MILLIGRAM(S): 80 TABLET, FILM COATED ORAL at 21:57

## 2023-11-13 RX ADMIN — Medication 100 MILLIGRAM(S): at 17:04

## 2023-11-13 RX ADMIN — TRAMADOL HYDROCHLORIDE 25 MILLIGRAM(S): 50 TABLET ORAL at 13:40

## 2023-11-13 RX ADMIN — Medication 3 MILLILITER(S): at 17:05

## 2023-11-13 RX ADMIN — Medication 20 MILLIGRAM(S): at 12:59

## 2023-11-13 RX ADMIN — IRON SUCROSE 176.67 MILLIGRAM(S): 20 INJECTION, SOLUTION INTRAVENOUS at 12:03

## 2023-11-13 RX ADMIN — TRAMADOL HYDROCHLORIDE 25 MILLIGRAM(S): 50 TABLET ORAL at 23:00

## 2023-11-13 RX ADMIN — TRAMADOL HYDROCHLORIDE 25 MILLIGRAM(S): 50 TABLET ORAL at 14:44

## 2023-11-13 RX ADMIN — Medication 3 MILLILITER(S): at 12:03

## 2023-11-13 RX ADMIN — TRAMADOL HYDROCHLORIDE 25 MILLIGRAM(S): 50 TABLET ORAL at 22:09

## 2023-11-13 RX ADMIN — Medication 3 MILLILITER(S): at 00:53

## 2023-11-13 RX ADMIN — TRAMADOL HYDROCHLORIDE 25 MILLIGRAM(S): 50 TABLET ORAL at 05:20

## 2023-11-13 RX ADMIN — Medication 100 MILLIGRAM(S): at 05:19

## 2023-11-13 RX ADMIN — Medication 1 TABLET(S): at 12:03

## 2023-11-13 RX ADMIN — Medication 1 TABLET(S): at 17:04

## 2023-11-13 RX ADMIN — APIXABAN 2.5 MILLIGRAM(S): 2.5 TABLET, FILM COATED ORAL at 05:20

## 2023-11-13 RX ADMIN — Medication 3 MILLILITER(S): at 05:19

## 2023-11-13 RX ADMIN — APIXABAN 2.5 MILLIGRAM(S): 2.5 TABLET, FILM COATED ORAL at 17:04

## 2023-11-13 NOTE — PHYSICAL THERAPY INITIAL EVALUATION ADULT - BED MOBILITY LIMITATIONS, REHAB EVAL
patient became tachy at edge of bed, up to 150bpm. To note, patient was also emotional while dangling d/t increase in pain

## 2023-11-13 NOTE — PROGRESS NOTE ADULT - PROBLEM SELECTOR PLAN 2
Patient reported episodes of diarrhea without melena or hematochezia due to multiple changes of PO ABx  CDiff PCR sent out and patient placed on CDiff precautions   Prominent stomach cramps on exam only relieved by IV tylenol Patient reported episodes of diarrhea without melena or hematochezia due to multiple changes of PO ABx  CDiff PCR sent out and patient placed on CDiff precautions; has not had a loose BM in >24 hours; precautions lifted  Prominent stomach cramps on exam only relieved by IV tylenol

## 2023-11-13 NOTE — PHYSICAL THERAPY INITIAL EVALUATION ADULT - RANGE OF MOTION EXAMINATION, REHAB EVAL
R LE; secondary to pain d/t "bone on bone" at the hip/Left UE ROM was WNL (within normal limits)/Right UE ROM was WNL (within normal limits)/Left LE ROM was WNL (within normal limits)/deficits as listed below

## 2023-11-13 NOTE — PHYSICAL THERAPY INITIAL EVALUATION ADULT - DIAGNOSIS, PT EVAL
7B: Impaired integumentary integrity associated with superficial skin involvement; 6B: impaired aerobic capacity/endurance associated with deconditioning

## 2023-11-13 NOTE — DISCHARGE NOTE PROVIDER - NSDCFUSCHEDAPPT_GEN_ALL_CORE_FT
[facility] Physician [facility]  [address]  Scheduled Appointment: 11/21/2023

## 2023-11-13 NOTE — PROGRESS NOTE ADULT - PROBLEM SELECTOR PLAN 5
Pt reported sensation of "ball" and pain on labia.  -right lower labia with well circumscribed 1-2cm fluctuant tender area likely vulvar cyst/abscess  -GYN consulted in ED  -D/C'ed Bactrim DS given JAMIN  -Started Doxycycline 100mg BID and Augmentin 875mg BID x 7 days, completes 11/16  -daily sitz baths, warm compresses 3x daily  -outpatient follow for biopsy with Emily Lazo after discharge and completion of Abx Pt reported sensation of "ball" and pain on labia.  -right lower labia with well circumscribed 1-2cm fluctuant tender area likely vulvar cyst/abscess  -GYN consulted in ED  -D/C'ed Bactrim DS given JAMIN  -Started Doxycycline 100mg BID and Augmentin 875mg BID x 7 days, completes 11/16  -daily sitz baths, warm compresses 3x daily  -outpatient follow for biopsy with [facility] on 11/21 @ 9 am Pt reported sensation of "ball" and pain on labia.  -right lower labia with well circumscribed 1-2cm fluctuant tender area likely vulvar cyst/abscess  -GYN following: Doxycycline 100mg BID and Augmentin 875mg BID x 7 days, completes 11/16  -daily sitz baths, warm compresses 3x daily  -outpatient follow for biopsy with [facility] on 11/21 @ 9 am

## 2023-11-13 NOTE — DISCHARGE NOTE PROVIDER - CARE PROVIDER_API CALL
Ivette Spicer  Cardiologist  [address] [address]  Phone: [phone]  Fax: (   )    -  Follow Up Time: 2 weeks

## 2023-11-13 NOTE — PROGRESS NOTE ADULT - ASSESSMENT
89 y/o F with PMHx of HTN, HLD, HFpEF (previous hospitalization at [facility] for HFpEF in 1/2023), Atrial fibrillation (on Eliquis), COPD/Asthma (prior hospitalizations unknown date), prior CVA (s/p thrombectomy, unknown year, no residual deficits) who presents with worsening SOB w/ minimal exertion and position changes and worsening manan LE swelling over the past few days w/ ruptured blisters. BNP 4862. Hs Trop T 60. CXR w/ cardiomegaly with significant pulmonary congestion.  Admitted to cardiac telemetry for acute on chronic HFpEF exacerbation, s/p IV diuresis c/b JAMIN w/ crea 1.9. GYN following for R vulvar cyst/abscess, started Augmentin/Doxycycline x7days. Pending GI PCR for suspected CDiff infection and daily reassessment of Lasix based on renal function  87 y/o F with PMHx of HTN, HLD, HFpEF (previous hospitalization at [facility] for HFpEF in 1/2023), Atrial fibrillation (on Eliquis), COPD/Asthma (prior hospitalizations unknown date), prior CVA (s/p thrombectomy, unknown year, no residual deficits) who presents with worsening SOB w/ minimal exertion and position changes and worsening manan LE swelling over the past few days w/ ruptured blisters. BNP 4862. Hs Trop T 60. CXR w/ cardiomegaly with significant pulmonary congestion.  Admitted to cardiac telemetry for acute on chronic HFpEF exacerbation, s/p IV diuresis c/b AJMIN w/ crea 1.9. GYN following for R vulvar cyst/abscess, started Augmentin/Doxycycline x7days. Now cleared off CDIF infection and pending PT for dispo 87 y/o F with PMHx of HTN, HLD, HFpEF (previous hospitalization at [facility] for HFpEF in 1/2023), Atrial fibrillation (on Eliquis), COPD/Asthma (prior hospitalizations unknown date), prior CVA (s/p thrombectomy, unknown year, no residual deficits) who presents with worsening SOB and worsening manan LE swelling over the past few days w/ ruptured blisters. BNP 4862. Admitted to cardiac telemetry for acute on chronic HFpEF exacerbation, s/p IV diuresis c/b JAMIN w/ crea 1.9. GYN following for R vulvar cyst/abscess on PO abx. Now cleared off CDIF infection and pending PT for dispo

## 2023-11-13 NOTE — DISCHARGE NOTE PROVIDER - NSDCMRMEDTOKEN_GEN_ALL_CORE_FT
atorvastatin 40 mg oral tablet: 1 tab(s) orally once a day  Eliquis 2.5 mg oral tablet: 1 tab(s) orally 2 times a day  Multiple Vitamins oral tablet: 1 tab(s) orally once a day  ProAir HFA 90 mcg/inh inhalation aerosol: 2 puff(s) inhaled 4 times a day, As Needed -for shortness of breath and/or wheezing  torsemide 20 mg oral tablet: 1 tab(s) orally once a day   acetaminophen 500 mg oral tablet: 2 tab(s) orally every 6 hours as needed for pain  amoxicillin-clavulanate 875 mg-125 mg oral tablet: 1 tab(s) orally every 12 hours  atorvastatin 40 mg oral tablet: 1 tab(s) orally once a day  doxycycline monohydrate 50 mg oral capsule: 2 cap(s) orally every 12 hours  Eliquis 2.5 mg oral tablet: 1 tab(s) orally 2 times a day  metoprolol succinate 25 mg oral tablet, extended release: 1 tab(s) orally once a day  Mucinex 600 mg oral tablet, extended release: 1 tab(s) orally every 12 hours  Multiple Vitamins oral tablet: 1 tab(s) orally once a day  ProAir HFA 90 mcg/inh inhalation aerosol: 2 puff(s) inhaled 4 times a day, As Needed -for shortness of breath and/or wheezing  spironolactone 25 mg oral tablet: 1 tab(s) orally once a day  torsemide 20 mg oral tablet: 1 tab(s) orally once a day

## 2023-11-13 NOTE — DISCHARGE NOTE PROVIDER - NSDCCPTREATMENT_GEN_ALL_CORE_FT
PRINCIPAL PROCEDURE  Procedure: 2D echocardiography  Findings and Treatment: CONCLUSIONS:   1. Normal left ventricular size and systolic function.   2. Mildly dilated right ventricular size.   3. Mildly reduced right ventricular systolic function.   4. Severely dilated right atrium.   5. Aortic sclerosis without significant stenosis.   6. Moderate tricuspid regurgitation.   7. Moderate pulmonic regurgitation.   8. Pulmonary hypertension present, pulmonary artery systolic pressure is 58 mmHg.   9. No pericardial effusion.

## 2023-11-13 NOTE — PROGRESS NOTE ADULT - PROBLEM SELECTOR PLAN 1
Unclear etiology of exacerbation. Follows w/ cardiologist Dr Rene Avila at [facility], reports compliance w/ Torsemide 20mg qd.  -CXR reveals cardiomegaly with significant pulmonary congestion  - BNP 4862. Trop T 60 ->70 -> 74, likely 2/2 HF exac  -EKG w/ Afib 90s, RBBB, PVC  -TTE 11/10/23: EF 60-65%, mildly dilated RV, severely dilated RA, mod TR/UT, pulm HTN w/ PASP 58mmHg  -Hold further IV lasix 40mg BID given uptrending crea. Reassess volume status in AM  -Core measures, strict I&O's, daily weight, fluid restriction Unclear etiology of exacerbation. Follows w/ cardiologist Dr Spicer at [facility], reports compliance w/ Torsemide 20mg qd.  - BNP 4862. Trop T 60 ->70 -> 74, likely 2/2 HF exac, diuresed on Lasix 40 IV BID   -TTE 11/10/23: EF 60-65%, mildly dilated RV, severely dilated RA, mod TR/MT, pulm HTN w/ PASP 58mmHg  - started on torsemide 20 mg PO daily 11/13 to be discharged on  -Core measures, strict I&O's, daily weight, fluid restriction

## 2023-11-13 NOTE — DISCHARGE NOTE PROVIDER - PROVIDER TOKENS
FREE:[LAST:[Nayan],FIRST:[Ivette],PHONE:[phone]],FAX:[(   [phone]    -],ADDRESS:[Cardiologist  [address] [place] [address]],FOLLOWUP:[2 weeks]]

## 2023-11-13 NOTE — PROGRESS NOTE ADULT - NS ATTEND AMEND GEN_ALL_CORE FT
88F w/ pmh of chronic HFpEF, HTN, HLD, AF on Eliquis, h/o CVA, COPD p/w acute on chronic HFpEF exacerbation    -HFpEF - acute on chronic HFpEF exacerbation, near euvolemic on exam - IV diuresis dc'd on Saturday. Will start Torsemide 20 PO daily, defer MRA given CKD and SGLT2i given UTI; monitor Is&Os  -AF - chronic AF - currently auto rate controlled AF; c/w Eliquis 2.5 BID  -ID - +UTI, lymphorrhea on right labia per Gyn - c/w Amoxicillin and Doxycycline  -DASH diet  -OOB to chair / PT eval   -DVT PPx  -Dispo: Cardiac Tele - Pt. and her family do not want [facility], only home PT    Vanessa Wilson MD

## 2023-11-13 NOTE — PROGRESS NOTE ADULT - PROBLEM SELECTOR PLAN 3
History of Afib, takes Eliquis 2.5, not on rate control agent  -EKG reveals Afib with rate of 79bpm, PVCs and RBBB  -current Afib w/ HR 80s-90s  -c/w home Eliquis 2.5mg BID  -Low TSH 0.264 on admission. Repeat TSH/FT4 WNL History of Afib, takes Eliquis 2.5, not on rate control agent  -EKG reveals Afib with rate of 79bpm, PVCs and RBBB  -current Afib w/ HR 80s-90s  -c/w home Eliquis 2.5mg BID

## 2023-11-13 NOTE — DISCHARGE NOTE PROVIDER - NSDCCPCAREPLAN_GEN_ALL_CORE_FT
PRINCIPAL DISCHARGE DIAGNOSIS  Diagnosis: Acute on chronic heart failure with preserved ejection fraction (HFpEF)  Assessment and Plan of Treatment:       SECONDARY DISCHARGE DIAGNOSES  Diagnosis: Multiple open wounds of lower leg  Assessment and Plan of Treatment:     Diagnosis: Vulvar cyst  Assessment and Plan of Treatment:      PRINCIPAL DISCHARGE DIAGNOSIS  Diagnosis: Acute on chronic heart failure with preserved ejection fraction (HFpEF)  Assessment and Plan of Treatment: You were admitted to the cardiac telemetry floor with congestive heart failure. Heart failure is a condition in which the heart does not pump or fill with blood well. As a result, the heart lags behind in its job of moving blood throughout the body. This can lead to symptoms such as swelling, trouble breathing, and feeling tired. You had an echocardiogram performed that showed the pumping function of your heart or Ejection Fraction is normal at 60-65%. Due the heart failure, your heart is unable to pump the blood effectively throughout your body and can cause fluid backup into your lungs and rest of body. You were given intravenous Lasix to get rid of the fluid in your lungs and body to help you breathe better. Please take diuretic medication Torsemide as prescribed without missing doses. You were started on Spironolactone and Metoprolol to help treat also congestive heart failure. Please maintain a low salt diet and fluid restriction of 1.5 liters per day to prevent from fluid being reaccumulated. Weigh yourself daily and report any weight gain over 2 pounds/day or 5 pounds per week to your Doctor. Please continue taking your medications as prescribed.      SECONDARY DISCHARGE DIAGNOSES  Diagnosis: Multiple open wounds of lower leg  Assessment and Plan of Treatment: You were found to have leg wounds after opened blisters of the bilateral legs. You will be seen by wound care nurse for dressing changes.  -Wound care recommendations: cleanse with normal saline, apply xeroform to wound beds, cover with Telfa, secure with Kerlix. Change daily. Leg elevation and heel offloading to decrease edema and prevent pressure injuries.    Diagnosis: Vulvar cyst  Assessment and Plan of Treatment: You were evaluated by GYN and found to have a right sided cyst or abscess to the vulva. Please complete the total of 7 days course of antibiotics Augmentin and Doxycycline, which will complete on 11/16/23. Follow up with GYN clinic as scheduled on 11/21/23 at 9AM as you will likely need to have a biopsy of the area after completion of antibiotics.    Diagnosis: Acute UTI  Assessment and Plan of Treatment: You were found to have a urinary tract infection upon presentation to the hospital. You may complete the above course of antibiotics to also treat UTI.    Diagnosis: Persistent atrial fibrillation  Assessment and Plan of Treatment: You have a history of irregular heart rhythm Atrial Fibrillation. Please START taking Metoprolol to help control rapid heart rates that can be occur with atrial fibrillation. Continue taking Eliquis to help prevent against stroke that can be associated with atrial fibrillation.     PRINCIPAL DISCHARGE DIAGNOSIS  Diagnosis: Acute on chronic heart failure with preserved ejection fraction (HFpEF)  Assessment and Plan of Treatment: You were admitted to the cardiac telemetry floor with congestive heart failure. Heart failure is a condition in which the heart does not pump or fill with blood well. As a result, the heart lags behind in its job of moving blood throughout the body. This can lead to symptoms such as swelling, trouble breathing, and feeling tired. You had an echocardiogram performed that showed the pumping function of your heart or Ejection Fraction is normal at 60-65%. Due the heart failure, your heart is unable to pump the blood effectively throughout your body and can cause fluid backup into your lungs and rest of body. You were given intravenous Lasix to get rid of the fluid in your lungs and body to help you breathe better. Please take diuretic medication Torsemide as prescribed without missing doses. You were started on Spironolactone and Metoprolol to help treat also congestive heart failure. Please maintain a low salt diet and fluid restriction of 1.5 liters per day to prevent from fluid being reaccumulated. Weigh yourself daily and report any weight gain over 2 pounds/day or 5 pounds per week to your Doctor. Please continue taking your medications as prescribed.      SECONDARY DISCHARGE DIAGNOSES  Diagnosis: Multiple open wounds of lower leg  Assessment and Plan of Treatment: You were found to have leg wounds after opened blisters of the bilateral legs. You will be seen by wound care nurse for dressing changes.  -Wound care recommendations: cleanse with normal saline, apply xeroform to wound beds, cover with Telfa, secure with Kerlix. Change daily. Leg elevation and heel offloading to decrease edema and prevent pressure injuries.    Diagnosis: Vulvar cyst  Assessment and Plan of Treatment: You were evaluated by GYN and found to have a right sided cyst or abscess to the vulva. Please complete the total of 7 days course of antibiotics Augmentin and Doxycycline, which will complete on 11/16/23. Follow up with GYN clinic as scheduled on 11/21/23 at 9AM as you will likely need to have a biopsy of the area after completion of antibiotics. Continue warm compress to the site 3 times per day along with sitz bath daily.    Diagnosis: Acute UTI  Assessment and Plan of Treatment: You were found to have a urinary tract infection upon presentation to the hospital. You may complete the above course of antibiotics to also treat UTI.    Diagnosis: Persistent atrial fibrillation  Assessment and Plan of Treatment: You have a history of irregular heart rhythm Atrial Fibrillation. Please START taking Metoprolol to help control rapid heart rates that can be occur with atrial fibrillation. Continue taking Eliquis to help prevent against stroke that can be associated with atrial fibrillation.

## 2023-11-13 NOTE — DISCHARGE NOTE PROVIDER - NSDCFUADDAPPT_GEN_ALL_CORE_FT
1. Follow up with [facility] as scheduled on 11/21/23 at 9 AM.  [address] [address]. Phone: [phone]    1. Follow up with your new cardiologist Dr Spicer at [facility]. The office will reach out to you to arrange an appointment within 2 weeks.  2. [facility]23 at 9 AM.  [address] [address]. Phone: [phone]

## 2023-11-13 NOTE — PHYSICAL THERAPY INITIAL EVALUATION ADULT - PERTINENT HX OF CURRENT PROBLEM, REHAB EVAL
89yo F with PMHx of HTN, HLD, HFpEF (previous hospitalization at [facility] [place] for HFpeF in 3/2022), Atrial fibrillation (on Eliquis), COPD/Asthma (prior hospitalizations unknown date), prior CVA (s/p thrombectomy, unknown year, no residual deficits) who presents with worsening bilateral lower extremity leg swelling over the past few days with "boils" to legs that have since popped and oozed clear yellow liquid. Pt states that she has shooting pain in B/L lower extremities that occurs intermittently and it is a 10/10. Pt states she has a visiting nurse who does dressing changes and last placed dressing to legs 2 days prior. In addition, pt has endorsed worsening shortness of breath with movement and position changes. Pt also endorses that on the way to the ED, pt noticed a "ball" like mass on her labia that was painful and getting bigger in size.

## 2023-11-13 NOTE — DISCHARGE NOTE PROVIDER - HOSPITAL COURSE
87yo F with PMHx of HTN, HLD, HFpEF (previous hospitalization at [facility] [place]peF in 3/2022), Atrial fibrillation (on Eliquis), COPD/Asthma (prior hospitalizations unknown date), prior CVA (s/p thrombectomy, unknown year, no residual deficits) who presents with worsening bilateral lower extremity leg swelling over the past few days with "boils" to legs that have since popped and oozed clear yellow liquid. Pt states that she has shooting pain in B/L lower extremities that occurs intermittently and it is a 10/10. Pt states she has a visiting nurse who does dressing changes and last placed dressing to legs 2 days prior. In addition, pt has endorsed worsening shortness of breath with movement and position changes.   Pt also endorses that on the way to the ED, pt noticed a "ball" like mass on her labia that was painful and getting bigger in size.    Patient was admitted to cardiac telemetry for acute on chronic HFpEF exacerbation, s/p IV diuresis c/b JAMIN with a peak creatine of 1.9. GYN was consulted for a R vulvar cyst/abscess on PO abx. Course c/b diarrhea, however it was attributed to food poisoning since the patient went >48 hours without a loose bowel movement. Now cleared off CDIF infection. Patient was seen by PT, however family is refusing SIOMARA at this time despite reccomendations.    On discharge, patients Cr. ____ and she will be discharged on torsemide 20 mg PO daily. Her dry weight is _____. She will follow up with Dr. Spicer in 7-10 days and with obgyn next week for a biopsy of the cyst to rule out malignant cause given age and severity of the cyst.       Discharge medications:         89yo F with PMHx of HTN, HLD, HFpEF [place] [facility] [place] Atrial fibrillation (on Eliquis), COPD/Asthma (prior hospitalizations unknown date), prior CVA (s/p thrombectomy, unknown year, no residual deficits) who presents with worsening bilateral lower extremity leg swelling over the past few days with "boils" to legs that have since popped and oozed clear yellow liquid. Pt states that she has shooting pain in B/L lower extremities that occurs intermittently and it is a 10/10. Pt states she has a visiting nurse who does dressing changes and last placed dressing to legs 2 days prior. In addition, pt has endorsed worsening shortness of breath with movement and position changes.   Pt also endorses that on the way to the ED, pt noticed a "ball" like mass on her labia that was painful and getting bigger in size.    Patient was admitted to cardiac telemetry for acute on chronic HFpEF exacerbation, s/p IV diuresis c/b JAMIN with a peak creatine of 1.9. GYN was consulted for a R vulvar cyst/abscess on PO abx. Course c/b diarrhea, however it was attributed to food poisoning since the patient went >48 hours without a loose bowel movement. Now cleared off CDIF infection. Patient was seen by PT, however family is refusing SIOMARA at this time despite reccomendations.    On discharge, patients Cr. ____ and she will be discharged on torsemide 20 mg PO daily. Her dry weight is _____. She will follow up with Dr. Spicer in 7-10 days and with obgyn next week for a biopsy of the cyst to rule out malignant cause given age and severity of the cyst.       Discharge medications:         87 y/o F with PMHx of HTN, HLD, HFpEF (previous hospitalization at [facility] [place] for HFpeF in 3/2022), Atrial fibrillation (on Eliquis), COPD/Asthma (prior hospitalizations unknown date), prior CVA (s/p thrombectomy, unknown year, no residual deficits) who presents with worsening bilateral lower extremity leg swelling over the past few days with "boils" to legs that have since popped and oozed clear yellow liquid. Pt states that she has shooting pain in B/L lower extremities that occurs intermittently and it is a 10/10. Pt states she has a visiting nurse who does dressing changes and last placed dressing to legs 2 days prior. In addition, pt has endorsed worsening shortness of breath with movement and position changes.   Pt also endorses that on the way to the ED, pt noticed a "ball" like mass on her labia that was painful and getting bigger in size.    Patient was admitted to cardiac telemetry for acute on chronic HFpEF exacerbation, s/p IV diuresis c/b JAMIN with a peak creatine of 1.9. GYN was consulted for a R vulvar cyst/abscess on PO abx. Course c/b diarrhea, however it was attributed to food poisoning since the patient went >48 hours without a loose bowel movement. Now cleared off CDIF infection.     87 y/o F with PMHx of HTN, HLD, HFpEF (previous hospitalization at [facility] for HFpEF in 1/2023), Atrial fibrillation (on Eliquis), COPD/Asthma (prior hospitalizations unknown date), prior CVA (s/p thrombectomy, unknown year, no residual deficits) who presents with worsening SOB and worsening manan LE swelling over the past few days w/ ruptured LE blisters. CXR w/ pulm vasc congestion. EKG reveals Afib with rate of 79bpm, PVCs and RBBB. BNP 4862. Hs Trop T 60 ->70 -> 74, troponemia likely 2/2 HF exac. Admitted to cardiac telemetry for acute on chronic HFpEF exacerbation. TTE 11/10/23: EF 60-65%, mildly dilated RV, severely dilated RA, mod TR/AR, pulm HTN w/ PASP 58mmHg. Pt was diuresed w/ IV Lasix 40mg BID, c/b JAMIN w/ crea uptrended to 1.9. Diureses was held for few days w/ improvement and resumed home PO Torsemide 20mg qd, crea 1.68 on day of discharge. Initiated GDMT: Spironolactone 25mg qd (crea 1.6, K less than 4) and Toprol 25mg qd given also noted Afib w/ HR 90-110s at rest. Deferring SGLT2i for HFpEF given UTI.     UA positive for UTI. UCx positive for klebsiella pneumonaie -asymptomatic. GYN consulted in ED for pt reported sensation of "ball" and pain on labia. Per GYN exam: Right lower labia with well circumscribed 1-2cm fluctuant tender area likely vulvar cyst/abscess. Rec Doxycycline 100mg BID and Augmentin 875mg BID x 7 days course, completes 11/16. Rec daily sitz baths, warm compresses 3x daily; and outpatient follow up next week for biopsy to rule out malignant cause given age and severity of the cyst. During hospitalization pt reported few episodes of diarrhea x 1 day without melena or hematochezia, self resolved.     Wound care RN evaluated manan LE wounds w/ ruptured bulla with superficial clean red wound beds. Wound care recs: cleanse with normal saline, apply xeroform to wound beds, cover with Telfa, secure with Kerlix. Change daily. Leg elevation and heel offloading to decrease edema and prevent pressure injuries.         PT rec SIOMARA on eval, but pt and family refusing SIOMARA and wish to return home w/ home care -set up by case management.  On the day of discharge, the patient was seen and examined. Symptoms improved. Vital signs are stable. Labs and imaging reviewed. Patient is medically optimized and hemodynamically stable. Return precautions discussed, medication teach back done, and importance of physician followup emphasized. The patient verbalized understanding.   87 y/o F with PMHx of HTN, HLD, HFpEF (previous hospitalization at [facility] for HFpEF in 1/2023), Atrial fibrillation (on Eliquis), COPD/Asthma (prior hospitalizations unknown date), prior CVA (s/p thrombectomy, unknown year, no residual deficits) who presents with worsening SOB and worsening manan LE swelling over the past few days w/ ruptured LE blisters. CXR w/ pulm vasc congestion. EKG reveals Afib with rate of 79bpm, PVCs and RBBB. BNP 4862. Hs Trop T 60 ->70 -> 74, troponemia likely 2/2 HF exac. Admitted to cardiac telemetry for acute on chronic HFpEF exacerbation. TTE 11/10/23: EF 60-65%, mildly dilated RV, severely dilated RA, mod TR/SD, pulm HTN w/ PASP 58mmHg. Pt was diuresed w/ IV Lasix 40mg BID, c/b JAMIN w/ crea uptrended to 1.9. Diureses was held for few days w/ improvement and resumed home PO Torsemide 20mg qd, crea 1.68 on day of discharge. Initiated GDMT: Spironolactone 25mg qd (crea 1.6, K less than 4) and Toprol 25mg qd given also noted Afib w/ HR 90-110s at rest. Deferring SGLT2i for HFpEF given UTI.     UA positive for UTI. UCx positive for klebsiella pneumonaie -asymptomatic. GYN consulted in ED for pt reported sensation of "ball" and pain on labia. Per GYN exam: Right lower labia with well circumscribed 1-2cm fluctuant tender area likely vulvar cyst/abscess. Rec Doxycycline 100mg BID and Augmentin 875mg BID x 7 days course, completes 11/16. Rec daily sitz baths, warm compresses 3x daily; and outpatient follow up next week for biopsy to rule out malignant cause given age and severity of the cyst. During hospitalization pt reported few episodes of diarrhea x 1 day without melena or hematochezia, self resolved.     Wound care RN evaluated manan LE wounds w/ ruptured bulla with superficial clean red wound beds. Wound care recs: cleanse with normal saline, apply xeroform to wound beds, cover with Telfa, secure with Kerlix. Change daily. Leg elevation and heel offloading to decrease edema and prevent pressure injuries.         PT rec SIOMARA on eval, but pt and family refusing SIOMARA and wish to return home w/ home care -set up by case management.  On the day of discharge, the patient was seen and examined. Symptoms improved. Vital signs are stable. Labs and imaging reviewed. Patient is medically optimized and hemodynamically stable. Return precautions discussed, medication teach back done, and importance of physician followup emphasized. The patient verbalized understanding.

## 2023-11-13 NOTE — PROGRESS NOTE ADULT - PROBLEM SELECTOR PLAN 4
Stable -130s  -Recently stopped amlodipine 5mg as outpt  -continue to monitor    #JAMIN  Crea uptrending since admission w/ diuresis: 1.43 -> 1.66 -> 1.9. Unclear baseline  -FEUrea 30%, prerenal etiolgy  -Hold off IV Lasix for now  -Monitor BUN/Crea  -Renally dose meds. Avoid nephrotoxic agents Stable -130s  -Recently stopped amlodipine 5mg as outpt  -continue to monitor    #JAMIN  Crea uptrending since admission w/ diuresis: 1.43 -> 1.66 -> 1.9. Unclear baseline  -FEUrea 30%, prerenal etiolgy  -Monitor BUN/Crea  -Renally dose meds. Avoid nephrotoxic agents

## 2023-11-13 NOTE — PHYSICAL THERAPY INITIAL EVALUATION ADULT - GENERAL OBSERVATIONS, REHAB EVAL
Patient received semi-supine supine in bed in no apparent distress, +primafit, +heplock, +EKG, +b/l LE bandaged.

## 2023-11-13 NOTE — PROGRESS NOTE ADULT - PROBLEM SELECTOR PLAN 8
Pt presents with multiple open, desquamated wounds on B/L lower extremities.  -denies any pus or purulent drainage. Afebrile, no s/s infection  -PRN Tramadol and Tylenol for pain control  -Wound care RN consulted: BLE: ruptured bulla with superficial clean red wound beds, small serosanguinous drainage, no erythema, mild nonpitting edema. Feet warm, dry, faint DPs. RLE wounds circumferential to upper gaitor area. LLE scattered small wounds to anterior gaitor, one 5x5cm wound to posterior gaitor.   -Wound care recs: cleanse with normal saline, apply xeroform to wound beds, cover with Telfa, secure with Kerlix. Change daily. Leg elevation and heel offloading to decrease edema and prevent pressure injuries.

## 2023-11-13 NOTE — PROGRESS NOTE ADULT - PROBLEM SELECTOR PLAN 10
-has hx of DM documented in prior records but pt denies hx  -A1c 5.2    F: None  E: Replete if K<4 or Mag<2  N: DASH Diet  VTEppx: Eliquis    Dispo: admit to cardiac tele, pending IV diuresis -has hx of DM documented in prior records but pt denies hx  -A1c 5.2    F: None  E: Replete if K<4 or Mag<2  N: DASH Diet  VTEppx: Eliquis    Dispo:  patient and family refusing SIOMARA; will go home with home PT

## 2023-11-13 NOTE — PROGRESS NOTE ADULT - SUBJECTIVE AND OBJECTIVE BOX
Cardiology PA Adult Progress Note    SUBJECTIVE ASSESSMENT: Overnight no acute events; Patient laying comfortably in bed. Currently denies CP, dizziness, palpitations, SOB, dyspnea, coughing, headaches, N/V/D/abdominal pain. Patient understands plan for the day.   	  MEDICATIONS:    amoxicillin  875 milliGRAM(s)/clavulanate 1 Tablet(s) Oral every 12 hours  doxycycline monohydrate Capsule 100 milliGRAM(s) Oral every 12 hours  albuterol/ipratropium for Nebulization 3 milliLiter(s) Nebulizer every 6 hours  acetaminophen     Tablet .. 975 milliGRAM(s) Oral every 6 hours PRN  acetaminophen   IVPB .. 1000 milliGRAM(s) IV Intermittent once  traMADol 25 milliGRAM(s) Oral every 8 hours PRN  atorvastatin 40 milliGRAM(s) Oral at bedtime  apixaban 2.5 milliGRAM(s) Oral two times a day  iron sucrose IVPB 300 milliGRAM(s) IV Intermittent every 24 hours  multivitamin 1 Tablet(s) Oral daily    VITAL SIGNS:  T(C): 36.7 (11-13-23 @ 09:01), Max: 36.8 (11-13-23 @ 00:36)  HR: 100 (11-13-23 @ 09:01) (92 - 103)  BP: 112/57 (11-13-23 @ 09:01) (99/56 - 146/65)  RR: 18 (11-13-23 @ 09:01) (18 - 18)  SpO2: 93% (11-13-23 @ 09:01) (93% - 97%)  Wt(kg): --    I&O's Summary    12 Nov 2023 07:01  -  13 Nov 2023 07:00  --------------------------------------------------------  IN: 417 mL / OUT: 1050 mL / NET: -633 mL    13 Nov 2023 07:01  -  13 Nov 2023 10:54  --------------------------------------------------------  IN: 1201 mL / OUT: 0 mL / NET: 1201 mL                                       PHYSICAL EXAM:  Appearance: Normal	  HEENT: Normal oral mucosa, PERRL, EOMI	  Neck: Supple, - JVD   Cardiovascular: Normal S1 S2, No murmurs  Respiratory: Lungs clear to auscultation No Rales, Rhonchi, Wheezing	  Gastrointestinal:  Soft, Non-tender, + BS	  Skin: No rashes, No ecchymoses, No cyanosis  Extremities: Normal range of motion, No clubbing, cyanosis or edema  Vascular: Peripheral pulses palpable 2+ bilaterally  Neurologic: Non-focal  Psychiatry: A & O x 3, Mood & affect appropriate    LABS:	 	               9.2    4.76  )-----------( 204      ( 13 Nov 2023 05:30 )             30.4     11-13    141  |  105  |  40<H>  ----------------------------<  94  4.3   |  27  |  1.80<H>    Ca    9.4      13 Nov 2023 05:30  Phos  3.6     11-12  Mg     2.0     11-13      Cardiology PA Adult Progress Note    SUBJECTIVE ASSESSMENT: Overnight no acute events; no loose bowel movements. Patient laying comfortably in bed. Currently denies CP, dizziness, palpitations, SOB, dyspnea, coughing, headaches, N/V/D/abdominal pain. Patient understands plan for the day.   	  MEDICATIONS:    amoxicillin  875 milliGRAM(s)/clavulanate 1 Tablet(s) Oral every 12 hours  doxycycline monohydrate Capsule 100 milliGRAM(s) Oral every 12 hours  albuterol/ipratropium for Nebulization 3 milliLiter(s) Nebulizer every 6 hours  acetaminophen     Tablet .. 975 milliGRAM(s) Oral every 6 hours PRN  acetaminophen   IVPB .. 1000 milliGRAM(s) IV Intermittent once  traMADol 25 milliGRAM(s) Oral every 8 hours PRN  atorvastatin 40 milliGRAM(s) Oral at bedtime  apixaban 2.5 milliGRAM(s) Oral two times a day  iron sucrose IVPB 300 milliGRAM(s) IV Intermittent every 24 hours  multivitamin 1 Tablet(s) Oral daily    VITAL SIGNS:  T(C): 36.7 (11-13-23 @ 09:01), Max: 36.8 (11-13-23 @ 00:36)  HR: 100 (11-13-23 @ 09:01) (92 - 103)  BP: 112/57 (11-13-23 @ 09:01) (99/56 - 146/65)  RR: 18 (11-13-23 @ 09:01) (18 - 18)  SpO2: 93% (11-13-23 @ 09:01) (93% - 97%)  Wt(kg): --    I&O's Summary    12 Nov 2023 07:01  -  13 Nov 2023 07:00  --------------------------------------------------------  IN: 417 mL / OUT: 1050 mL / NET: -633 mL    13 Nov 2023 07:01  -  13 Nov 2023 10:54  --------------------------------------------------------  IN: 1201 mL / OUT: 0 mL / NET: 1201 mL                                       PHYSICAL EXAM:  Appearance: Normal	  HEENT: Normal oral mucosa, PERRL, EOMI	  Neck: Supple, - JVD   Cardiovascular: Normal S1 S2, No murmurs  Respiratory: Lungs clear to auscultation No Rales, Rhonchi, Wheezing	  Gastrointestinal:  Soft, Non-tender, + BS	  Skin: No rashes, No ecchymoses, No cyanosis  Extremities: Normal range of motion, No clubbing, cyanosis or edema  Vascular: Peripheral pulses palpable 2+ bilaterally  Neurologic: Non-focal  Psychiatry: A & O x 3, Mood & affect appropriate    LABS:	 	               9.2    4.76  )-----------( 204      ( 13 Nov 2023 05:30 )             30.4     11-13    141  |  105  |  40<H>  ----------------------------<  94  4.3   |  27  |  1.80<H>    Ca    9.4      13 Nov 2023 05:30  Phos  3.6     11-12  Mg     2.0     11-13

## 2023-11-14 LAB
ANION GAP SERPL CALC-SCNC: 11 MMOL/L — SIGNIFICANT CHANGE UP (ref 5–17)
ANION GAP SERPL CALC-SCNC: 11 MMOL/L — SIGNIFICANT CHANGE UP (ref 5–17)
BUN SERPL-MCNC: 37 MG/DL — HIGH (ref 7–23)
BUN SERPL-MCNC: 37 MG/DL — HIGH (ref 7–23)
CALCIUM SERPL-MCNC: 9.7 MG/DL — SIGNIFICANT CHANGE UP (ref 8.4–10.5)
CALCIUM SERPL-MCNC: 9.7 MG/DL — SIGNIFICANT CHANGE UP (ref 8.4–10.5)
CHLORIDE SERPL-SCNC: 105 MMOL/L — SIGNIFICANT CHANGE UP (ref 96–108)
CHLORIDE SERPL-SCNC: 105 MMOL/L — SIGNIFICANT CHANGE UP (ref 96–108)
CO2 SERPL-SCNC: 24 MMOL/L — SIGNIFICANT CHANGE UP (ref 22–31)
CO2 SERPL-SCNC: 24 MMOL/L — SIGNIFICANT CHANGE UP (ref 22–31)
CREAT SERPL-MCNC: 1.75 MG/DL — HIGH (ref 0.5–1.3)
CREAT SERPL-MCNC: 1.75 MG/DL — HIGH (ref 0.5–1.3)
CULTURE RESULTS: SIGNIFICANT CHANGE UP
EGFR: 28 ML/MIN/1.73M2 — LOW
EGFR: 28 ML/MIN/1.73M2 — LOW
GLUCOSE SERPL-MCNC: 97 MG/DL — SIGNIFICANT CHANGE UP (ref 70–99)
GLUCOSE SERPL-MCNC: 97 MG/DL — SIGNIFICANT CHANGE UP (ref 70–99)
HCT VFR BLD CALC: 30.6 % — LOW (ref 34.5–45)
HCT VFR BLD CALC: 30.6 % — LOW (ref 34.5–45)
HGB BLD-MCNC: 9.1 G/DL — LOW (ref 11.5–15.5)
HGB BLD-MCNC: 9.1 G/DL — LOW (ref 11.5–15.5)
MAGNESIUM SERPL-MCNC: 2 MG/DL — SIGNIFICANT CHANGE UP (ref 1.6–2.6)
MAGNESIUM SERPL-MCNC: 2 MG/DL — SIGNIFICANT CHANGE UP (ref 1.6–2.6)
MCHC RBC-ENTMCNC: 29.7 GM/DL — LOW (ref 32–36)
MCHC RBC-ENTMCNC: 29.7 GM/DL — LOW (ref 32–36)
MCHC RBC-ENTMCNC: 30.4 PG — SIGNIFICANT CHANGE UP (ref 27–34)
MCHC RBC-ENTMCNC: 30.4 PG — SIGNIFICANT CHANGE UP (ref 27–34)
MCV RBC AUTO: 102.3 FL — HIGH (ref 80–100)
MCV RBC AUTO: 102.3 FL — HIGH (ref 80–100)
NRBC # BLD: 0 /100 WBCS — SIGNIFICANT CHANGE UP (ref 0–0)
NRBC # BLD: 0 /100 WBCS — SIGNIFICANT CHANGE UP (ref 0–0)
PLATELET # BLD AUTO: 218 K/UL — SIGNIFICANT CHANGE UP (ref 150–400)
PLATELET # BLD AUTO: 218 K/UL — SIGNIFICANT CHANGE UP (ref 150–400)
POTASSIUM SERPL-MCNC: 4.5 MMOL/L — SIGNIFICANT CHANGE UP (ref 3.5–5.3)
POTASSIUM SERPL-MCNC: 4.5 MMOL/L — SIGNIFICANT CHANGE UP (ref 3.5–5.3)
POTASSIUM SERPL-SCNC: 4.5 MMOL/L — SIGNIFICANT CHANGE UP (ref 3.5–5.3)
POTASSIUM SERPL-SCNC: 4.5 MMOL/L — SIGNIFICANT CHANGE UP (ref 3.5–5.3)
RBC # BLD: 2.99 M/UL — LOW (ref 3.8–5.2)
RBC # BLD: 2.99 M/UL — LOW (ref 3.8–5.2)
RBC # FLD: 14.6 % — HIGH (ref 10.3–14.5)
RBC # FLD: 14.6 % — HIGH (ref 10.3–14.5)
SODIUM SERPL-SCNC: 140 MMOL/L — SIGNIFICANT CHANGE UP (ref 135–145)
SODIUM SERPL-SCNC: 140 MMOL/L — SIGNIFICANT CHANGE UP (ref 135–145)
SPECIMEN SOURCE: SIGNIFICANT CHANGE UP
WBC # BLD: 5.91 K/UL — SIGNIFICANT CHANGE UP (ref 3.8–10.5)
WBC # BLD: 5.91 K/UL — SIGNIFICANT CHANGE UP (ref 3.8–10.5)
WBC # FLD AUTO: 5.91 K/UL — SIGNIFICANT CHANGE UP (ref 3.8–10.5)
WBC # FLD AUTO: 5.91 K/UL — SIGNIFICANT CHANGE UP (ref 3.8–10.5)

## 2023-11-14 PROCEDURE — 99233 SBSQ HOSP IP/OBS HIGH 50: CPT

## 2023-11-14 RX ORDER — METOPROLOL TARTRATE 50 MG
25 TABLET ORAL DAILY
Refills: 0 | Status: DISCONTINUED | OUTPATIENT
Start: 2023-11-14 | End: 2023-11-15

## 2023-11-14 RX ORDER — SPIRONOLACTONE 25 MG/1
25 TABLET, FILM COATED ORAL DAILY
Refills: 0 | Status: DISCONTINUED | OUTPATIENT
Start: 2023-11-14 | End: 2023-11-15

## 2023-11-14 RX ADMIN — Medication 3 MILLILITER(S): at 05:42

## 2023-11-14 RX ADMIN — IRON SUCROSE 176.67 MILLIGRAM(S): 20 INJECTION, SOLUTION INTRAVENOUS at 13:09

## 2023-11-14 RX ADMIN — TRAMADOL HYDROCHLORIDE 25 MILLIGRAM(S): 50 TABLET ORAL at 19:23

## 2023-11-14 RX ADMIN — Medication 100 MILLIGRAM(S): at 06:20

## 2023-11-14 RX ADMIN — Medication 20 MILLIGRAM(S): at 05:42

## 2023-11-14 RX ADMIN — TRAMADOL HYDROCHLORIDE 25 MILLIGRAM(S): 50 TABLET ORAL at 07:20

## 2023-11-14 RX ADMIN — SPIRONOLACTONE 25 MILLIGRAM(S): 25 TABLET, FILM COATED ORAL at 17:50

## 2023-11-14 RX ADMIN — Medication 975 MILLIGRAM(S): at 10:54

## 2023-11-14 RX ADMIN — APIXABAN 2.5 MILLIGRAM(S): 2.5 TABLET, FILM COATED ORAL at 17:11

## 2023-11-14 RX ADMIN — Medication 1 TABLET(S): at 05:42

## 2023-11-14 RX ADMIN — Medication 1 TABLET(S): at 17:11

## 2023-11-14 RX ADMIN — Medication 975 MILLIGRAM(S): at 11:50

## 2023-11-14 RX ADMIN — Medication 100 MILLIGRAM(S): at 17:11

## 2023-11-14 RX ADMIN — Medication 3 MILLILITER(S): at 11:11

## 2023-11-14 RX ADMIN — APIXABAN 2.5 MILLIGRAM(S): 2.5 TABLET, FILM COATED ORAL at 05:42

## 2023-11-14 RX ADMIN — ATORVASTATIN CALCIUM 40 MILLIGRAM(S): 80 TABLET, FILM COATED ORAL at 21:24

## 2023-11-14 RX ADMIN — Medication 3 MILLILITER(S): at 17:51

## 2023-11-14 RX ADMIN — Medication 1 TABLET(S): at 11:11

## 2023-11-14 RX ADMIN — TRAMADOL HYDROCHLORIDE 25 MILLIGRAM(S): 50 TABLET ORAL at 20:06

## 2023-11-14 RX ADMIN — Medication 25 MILLIGRAM(S): at 17:50

## 2023-11-14 RX ADMIN — TRAMADOL HYDROCHLORIDE 25 MILLIGRAM(S): 50 TABLET ORAL at 06:20

## 2023-11-14 NOTE — PROGRESS NOTE ADULT - PROBLEM SELECTOR PLAN 7
Pt has hx of COPD, had 1 prior hospitalization but unknown year  -B/L wheezes on exam on admission  -home meds: albuterol PRN  -start duonebs q 6 hours Pt has hx of COPD, had 1 prior hospitalization but unknown year  -B/L wheezes on exam on admission  -home meds: albuterol PRN  -start Duonebs q 6 hours

## 2023-11-14 NOTE — PROGRESS NOTE ADULT - PROBLEM SELECTOR PLAN 5
Pt reported sensation of "ball" and pain on labia.  -right lower labia with well circumscribed 1-2cm fluctuant tender area likely vulvar cyst/abscess  -GYN following: Doxycycline 100mg BID and Augmentin 875mg BID x 7 days, completes 11/16  -daily sitz baths, warm compresses 3x daily  -outpatient follow for biopsy with [facility] on 11/21 @ 9 am Patient reported episodes of diarrhea without melena or hematochezia due to multiple changes of PO ABx  -Prominent stomach cramps on exam relieved by IV Tylenol  -Has not had a loose BM since 11/11; No further contact precautions needed

## 2023-11-14 NOTE — PROGRESS NOTE ADULT - PROBLEM SELECTOR PLAN 2
Patient reported episodes of diarrhea without melena or hematochezia due to multiple changes of PO ABx  CDiff PCR sent out and patient placed on CDiff precautions; has not had a loose BM in >24 hours; precautions lifted  Prominent stomach cramps on exam only relieved by IV tylenol History of Afib, takes Eliquis 2.5, not on rate control agent at home.  -EKG reveals Afib with rate of 79bpm, PVCs and RBBB  -current Afib w/ HR 90-110s at rest  -Start Toprol 25mg qd for rate control  -c/w home Eliquis 2.5mg BID

## 2023-11-14 NOTE — PROGRESS NOTE ADULT - PROBLEM SELECTOR PLAN 3
History of Afib, takes Eliquis 2.5, not on rate control agent  -EKG reveals Afib with rate of 79bpm, PVCs and RBBB  -current Afib w/ HR 80s-90s  -c/w home Eliquis 2.5mg BID Stable -130s  -Recently stopped amlodipine 5mg as outpt  -Start Toprol 25mg qd    #JAMIN  Crea uptrended w/ diuresis: 1.43 -> 1.66 -> 1.9 , now downtrending. Unclear baseline  -FEUrea 30%, prerenal etiology  -Monitor BUN/Crea  -Renally dose meds. Avoid nephrotoxic agents

## 2023-11-14 NOTE — PROGRESS NOTE ADULT - ASSESSMENT
87 y/o F with PMHx of HTN, HLD, HFpEF (previous hospitalization at [facility] for HFpEF in 1/2023), Atrial fibrillation (on Eliquis), COPD/Asthma (prior hospitalizations unknown date), prior CVA (s/p thrombectomy, unknown year, no residual deficits) who presents with worsening SOB and worsening manan LE swelling over the past few days w/ ruptured blisters. BNP 4862. Admitted to cardiac telemetry for acute on chronic HFpEF exacerbation, s/p IV diuresis c/b JAMIN w/ crea 1.9. GYN following for R vulvar cyst/abscess on PO abx. Now cleared off CDIF infection and pending PT for dispo 87 y/o F with PMHx of HTN, HLD, HFpEF (previous hospitalization at [facility] for HFpEF in 1/2023), Atrial fibrillation (on Eliquis), Asthma (prior hospitalizations unknown date), prior CVA (s/p thrombectomy, unknown year, no residual deficits) who presents with worsening SOB and worsening manan LE swelling over the past few days w/ ruptured blisters. BNP 4862. Admitted to cardiac telemetry for acute on chronic HFpEF exacerbation, s/p IV diuresis c/b JAMIN w/ crea 1.9. GYN following for R vulvar cyst/abscess on PO Abx x 7days.  87 y/o F with PMHx of HTN, HLD, HFpEF (previous hospitalization at [facility] for HFpEF in 1/2023), Atrial fibrillation (on Eliquis), COPD/Asthma (prior hospitalizations unknown date), prior CVA (s/p thrombectomy, unknown year, no residual deficits) who presents with worsening SOB and worsening manan LE swelling over the past few days w/ ruptured blisters. BNP 4862. Admitted to cardiac telemetry for acute on chronic HFpEF exacerbation, s/p IV diuresis c/b JAMIN w/ crea 1.9, now improving and initiating GDMT. GYN following for R vulvar cyst/abscess on PO Abx x 7days.

## 2023-11-14 NOTE — PROGRESS NOTE ADULT - SUBJECTIVE AND OBJECTIVE BOX
CARDIOLOGY NP PROGRESS NOTE    Subjective:   Remainder ROS otherwise negative.    Overnight Events:     TELEMETRY:         VITAL SIGNS:  T(C): 36.7 (11-14-23 @ 12:20), Max: 37.2 (11-13-23 @ 17:01)  HR: 104 (11-14-23 @ 12:20) (98 - 104)  BP: 114/58 (11-14-23 @ 12:20) (114/58 - 157/72)  RR: 17 (11-14-23 @ 12:20) (17 - 20)  SpO2: 95% (11-14-23 @ 12:20) (94% - 96%)  Wt(kg): --    I&O's Summary    13 Nov 2023 07:01  -  14 Nov 2023 07:00  --------------------------------------------------------  IN: 1801 mL / OUT: 1000 mL / NET: 801 mL    14 Nov 2023 07:01  -  14 Nov 2023 14:26  --------------------------------------------------------  IN: 180 mL / OUT: 400 mL / NET: -220 mL          PHYSICAL EXAM:    General: A/ox 3, No acute Distress  Neck: Supple, NO JVD  Cardiac: S1 S2, No M/R/G  Pulmonary: CTAB, Breathing unlabored, No Rhonchi/Rales/Wheezing  Abdomen: Soft, Non -tender, +BS x 4 quads  Extremities: No Rashes, No edema  Neuro: A/o x 3, No focal deficits          LABS:                          9.1    5.91  )-----------( 218      ( 14 Nov 2023 06:41 )             30.6                              11-14    140  |  105  |  37<H>  ----------------------------<  97  4.5   |  24  |  1.75<H>    Ca    9.7      14 Nov 2023 06:41  Mg     2.0     11-14                                CAPILLARY BLOOD GLUCOSE                Allergies:  morphine (Unknown)  macrolide antibiotics (Anaphylaxis)  adhesives (Blisters)  contrast dye (Anaphylaxis)  iodine (Blisters)  Percocet (Unknown)    MEDICATIONS  (STANDING):  acetaminophen   IVPB .. 1000 milliGRAM(s) IV Intermittent once  albuterol/ipratropium for Nebulization 3 milliLiter(s) Nebulizer every 6 hours  amoxicillin  875 milliGRAM(s)/clavulanate 1 Tablet(s) Oral every 12 hours  apixaban 2.5 milliGRAM(s) Oral two times a day  atorvastatin 40 milliGRAM(s) Oral at bedtime  doxycycline monohydrate Capsule 100 milliGRAM(s) Oral every 12 hours  multivitamin 1 Tablet(s) Oral daily  torsemide 20 milliGRAM(s) Oral daily    MEDICATIONS  (PRN):  acetaminophen     Tablet .. 975 milliGRAM(s) Oral every 6 hours PRN Mild Pain (1 - 3), Moderate Pain (4 - 6)  traMADol 25 milliGRAM(s) Oral every 8 hours PRN Severe Pain (7 - 10)        DIAGNOSTIC TESTS:        CARDIOLOGY NP PROGRESS NOTE    Subjective: Pt seen and examined at bedside. Reports feeling well, nico LE pain is tolerable w/ Tramadol PO. Denies chest pain, sob, lightheadedness, dizziness, palpitations, fever, chills.  Remainder ROS otherwise negative.    Overnight Events: none    TELEMETRY: Afib 70-100s         VITAL SIGNS:  T(C): 36.7 (11-14-23 @ 12:20), Max: 37.2 (11-13-23 @ 17:01)  HR: 104 (11-14-23 @ 12:20) (98 - 104)  BP: 114/58 (11-14-23 @ 12:20) (114/58 - 157/72)  RR: 17 (11-14-23 @ 12:20) (17 - 20)  SpO2: 95% (11-14-23 @ 12:20) (94% - 96%)  Wt(kg): --    I&O's Summary    13 Nov 2023 07:01  -  14 Nov 2023 07:00  --------------------------------------------------------  IN: 1801 mL / OUT: 1000 mL / NET: 801 mL    14 Nov 2023 07:01  -  14 Nov 2023 14:26  --------------------------------------------------------  IN: 180 mL / OUT: 400 mL / NET: -220 mL          PHYSICAL EXAM:    General: A/ox 3, No acute Distress, obese  Neck: Supple, + JVD  Cardiac: S1 S2, No M/R/G  Pulmonary: Lungs w/ improved bibasilar crackles, Breathing unlabored on RA, No Rhonchi/Wheezing  Abdomen: Soft, Non -tender, +BS x 4 quads  Extremities: Nico LE ruptured bulla w/ Kerlix wrapping, no erythema, Nico LE 2+ edema  Neuro: A/o x 3, No focal deficits          LABS:                          9.1    5.91  )-----------( 218      ( 14 Nov 2023 06:41 )             30.6                              11-14    140  |  105  |  37<H>  ----------------------------<  97  4.5   |  24  |  1.75<H>    Ca    9.7      14 Nov 2023 06:41  Mg     2.0     11-14                                CAPILLARY BLOOD GLUCOSE                Allergies:  morphine (Unknown)  macrolide antibiotics (Anaphylaxis)  adhesives (Blisters)  contrast dye (Anaphylaxis)  iodine (Blisters)  Percocet (Unknown)    MEDICATIONS  (STANDING):  acetaminophen   IVPB .. 1000 milliGRAM(s) IV Intermittent once  albuterol/ipratropium for Nebulization 3 milliLiter(s) Nebulizer every 6 hours  amoxicillin  875 milliGRAM(s)/clavulanate 1 Tablet(s) Oral every 12 hours  apixaban 2.5 milliGRAM(s) Oral two times a day  atorvastatin 40 milliGRAM(s) Oral at bedtime  doxycycline monohydrate Capsule 100 milliGRAM(s) Oral every 12 hours  multivitamin 1 Tablet(s) Oral daily  torsemide 20 milliGRAM(s) Oral daily    MEDICATIONS  (PRN):  acetaminophen     Tablet .. 975 milliGRAM(s) Oral every 6 hours PRN Mild Pain (1 - 3), Moderate Pain (4 - 6)  traMADol 25 milliGRAM(s) Oral every 8 hours PRN Severe Pain (7 - 10)        DIAGNOSTIC TESTS:

## 2023-11-14 NOTE — PROGRESS NOTE ADULT - PROBLEM/PLAN-5
DISPLAY PLAN FREE TEXT
[place]

## 2023-11-14 NOTE — PROGRESS NOTE ADULT - PROBLEM SELECTOR PLAN 1
Unclear etiology of exacerbation. Follows w/ cardiologist Dr Spicer at [facility], reports compliance w/ Torsemide 20mg qd.  - BNP 4862. Trop T 60 ->70 -> 74, likely 2/2 HF exac, diuresed on Lasix 40 IV BID   -TTE 11/10/23: EF 60-65%, mildly dilated RV, severely dilated RA, mod TR/NY, pulm HTN w/ PASP 58mmHg  - started on torsemide 20 mg PO daily 11/13 to be discharged on  -Core measures, strict I&O's, daily weight, fluid restriction Unclear etiology of exacerbation. Follows w/ cardiologist Dr Spicer at [facility], reports compliance w/ Torsemide 20mg qd.  - BNP 4862. Trop T 60 ->70 -> 74, troponemia likely 2/2 HF exac  -TTE 11/10/23: EF 60-65%, mildly dilated RV, severely dilated RA, mod TR/TN, pulm HTN w/ PASP 58mmHg  -S/p Lasix 40 IV BID. Started on Torsemide 20 mg PO daily 11/13    -Start Spironolactone 25mg qd (crea 1.75, K less than 4)  -Start Toprol 25mg qd  -Defer SGLT2i for HFpEF given UTI   -Core measures, strict I&O's, daily weight, fluid restriction

## 2023-11-14 NOTE — PROGRESS NOTE ADULT - NS ATTEND AMEND GEN_ALL_CORE FT
I saw, evaluated, and examined the patient at the bedside. I discussed the patient’s case with the ACP and agree with ACP’s note, ROS findings, physical exam, assessment, and plan as documented in the ACP’s note, with the following addendum.     87 yo lady PMHx of HFpEF, HTN, HLD, persistent Afib, and COPD who was admitted for acute on chronic decompensated HFpEF exacerbation.    Assessment  1. Acute on chronic decompensated HFpEF exacerbation  2. Persistent Afib   3. HTN  4. HLD  5. Lymphorrhea on right labia    Plan  1. Torsemide 20mg PO daily  2.       During non face-to-face time, I reviewed relevant portions of the patient’s medical record. During face-to-face time, I took a relevant history and examined the patient. I also explained differential diagnoses, relevant cardiac diagnoses, workup, and management plan, which required a moderate level of medical decision making. I answered all questions related to the patient's medical conditions.     Osei Junior M.D.  CARDIOLOGY ATTENDING I saw, evaluated, and examined the patient at the bedside. I discussed the patient’s case with the ACP and agree with ACP’s note, ROS findings, physical exam, assessment, and plan as documented in the ACP’s note, with the following addendum.     89 yo lady PMHx of HFpEF, HTN, HLD, persistent Afib, and COPD who was admitted for acute on chronic decompensated HFpEF exacerbation.    Assessment  1. Acute on chronic decompensated HFpEF exacerbation  2. Persistent Afib   3. HTN  4. HLD  5. Lymphorrhea on right labia    Plan  1. Torsemide 20mg PO daily  2. Start spironolactone 25mg PO daily for HFpEF  3. Defer SGLT2i for HFpEF given UTI   4. Apixaban 2.5mg BID for systemic embolization prevention  5. C/w PO abx as per GYN for lymphorreha on right labia    During non face-to-face time, I reviewed relevant portions of the patient’s medical record. During face-to-face time, I took a relevant history and examined the patient. I also explained differential diagnoses, relevant cardiac diagnoses, workup, and management plan, which required a high level of medical decision making. I answered all questions related to the patient's medical conditions.     Osei Junior M.D.  CARDIOLOGY ATTENDING

## 2023-11-14 NOTE — PROGRESS NOTE ADULT - PROBLEM SELECTOR PLAN 4
Stable -130s  -Recently stopped amlodipine 5mg as outpt  -continue to monitor    #JAMIN  Crea uptrending since admission w/ diuresis: 1.43 -> 1.66 -> 1.9. Unclear baseline  -FEUrea 30%, prerenal etiolgy  -Monitor BUN/Crea  -Renally dose meds. Avoid nephrotoxic agents Pt reported sensation of "ball" and pain on labia.  -Right lower labia with well circumscribed 1-2cm fluctuant tender area likely vulvar cyst/abscess  -GYN following: Doxycycline 100mg BID and Augmentin 875mg BID x 7 days, completes 11/16  -daily sitz baths, warm compresses 3x daily  -outpatient follow for biopsy with [facility] on 11/21 @ 9 am

## 2023-11-14 NOTE — PROGRESS NOTE ADULT - PROBLEM SELECTOR PLAN 10
-has hx of DM documented in prior records but pt denies hx  -A1c 5.2    F: None  E: Replete if K<4 or Mag<2  N: DASH Diet  VTEppx: Eliquis    Dispo:  patient and family refusing [facility]; will go home with home PT -has hx of DM documented in prior records but pt denies hx  -A1c 5.2    F: None  E: Replete if K<4 or Mag<2  N: DASH Diet  VTEppx: Eliquis    Dispo: likely DC home tomorrow  -PT rec [facility], but patient and family refusing [facility]; will go home with home PT

## 2023-11-15 ENCOUNTER — TRANSCRIPTION ENCOUNTER (OUTPATIENT)
Age: 88
End: 2023-11-15

## 2023-11-15 VITALS — HEART RATE: 95 BPM | SYSTOLIC BLOOD PRESSURE: 129 MMHG | DIASTOLIC BLOOD PRESSURE: 66 MMHG

## 2023-11-15 LAB
ANION GAP SERPL CALC-SCNC: 8 MMOL/L — SIGNIFICANT CHANGE UP (ref 5–17)
ANION GAP SERPL CALC-SCNC: 8 MMOL/L — SIGNIFICANT CHANGE UP (ref 5–17)
BUN SERPL-MCNC: 39 MG/DL — HIGH (ref 7–23)
BUN SERPL-MCNC: 39 MG/DL — HIGH (ref 7–23)
CALCIUM SERPL-MCNC: 9.5 MG/DL — SIGNIFICANT CHANGE UP (ref 8.4–10.5)
CALCIUM SERPL-MCNC: 9.5 MG/DL — SIGNIFICANT CHANGE UP (ref 8.4–10.5)
CHLORIDE SERPL-SCNC: 107 MMOL/L — SIGNIFICANT CHANGE UP (ref 96–108)
CHLORIDE SERPL-SCNC: 107 MMOL/L — SIGNIFICANT CHANGE UP (ref 96–108)
CO2 SERPL-SCNC: 27 MMOL/L — SIGNIFICANT CHANGE UP (ref 22–31)
CO2 SERPL-SCNC: 27 MMOL/L — SIGNIFICANT CHANGE UP (ref 22–31)
CREAT SERPL-MCNC: 1.68 MG/DL — HIGH (ref 0.5–1.3)
CREAT SERPL-MCNC: 1.68 MG/DL — HIGH (ref 0.5–1.3)
EGFR: 29 ML/MIN/1.73M2 — LOW
EGFR: 29 ML/MIN/1.73M2 — LOW
GLUCOSE BLDC GLUCOMTR-MCNC: 83 MG/DL — SIGNIFICANT CHANGE UP (ref 70–99)
GLUCOSE BLDC GLUCOMTR-MCNC: 83 MG/DL — SIGNIFICANT CHANGE UP (ref 70–99)
GLUCOSE BLDC GLUCOMTR-MCNC: 93 MG/DL — SIGNIFICANT CHANGE UP (ref 70–99)
GLUCOSE BLDC GLUCOMTR-MCNC: 93 MG/DL — SIGNIFICANT CHANGE UP (ref 70–99)
GLUCOSE SERPL-MCNC: 88 MG/DL — SIGNIFICANT CHANGE UP (ref 70–99)
GLUCOSE SERPL-MCNC: 88 MG/DL — SIGNIFICANT CHANGE UP (ref 70–99)
HCT VFR BLD CALC: 30.3 % — LOW (ref 34.5–45)
HCT VFR BLD CALC: 30.3 % — LOW (ref 34.5–45)
HGB BLD-MCNC: 9.1 G/DL — LOW (ref 11.5–15.5)
HGB BLD-MCNC: 9.1 G/DL — LOW (ref 11.5–15.5)
MAGNESIUM SERPL-MCNC: 2 MG/DL — SIGNIFICANT CHANGE UP (ref 1.6–2.6)
MAGNESIUM SERPL-MCNC: 2 MG/DL — SIGNIFICANT CHANGE UP (ref 1.6–2.6)
MCHC RBC-ENTMCNC: 30 GM/DL — LOW (ref 32–36)
MCHC RBC-ENTMCNC: 30 GM/DL — LOW (ref 32–36)
MCHC RBC-ENTMCNC: 30.4 PG — SIGNIFICANT CHANGE UP (ref 27–34)
MCHC RBC-ENTMCNC: 30.4 PG — SIGNIFICANT CHANGE UP (ref 27–34)
MCV RBC AUTO: 101.3 FL — HIGH (ref 80–100)
MCV RBC AUTO: 101.3 FL — HIGH (ref 80–100)
NRBC # BLD: 0 /100 WBCS — SIGNIFICANT CHANGE UP (ref 0–0)
NRBC # BLD: 0 /100 WBCS — SIGNIFICANT CHANGE UP (ref 0–0)
PHOSPHATE SERPL-MCNC: 4.3 MG/DL — SIGNIFICANT CHANGE UP (ref 2.5–4.5)
PHOSPHATE SERPL-MCNC: 4.3 MG/DL — SIGNIFICANT CHANGE UP (ref 2.5–4.5)
PLATELET # BLD AUTO: 206 K/UL — SIGNIFICANT CHANGE UP (ref 150–400)
PLATELET # BLD AUTO: 206 K/UL — SIGNIFICANT CHANGE UP (ref 150–400)
POTASSIUM SERPL-MCNC: 4.6 MMOL/L — SIGNIFICANT CHANGE UP (ref 3.5–5.3)
POTASSIUM SERPL-MCNC: 4.6 MMOL/L — SIGNIFICANT CHANGE UP (ref 3.5–5.3)
POTASSIUM SERPL-SCNC: 4.6 MMOL/L — SIGNIFICANT CHANGE UP (ref 3.5–5.3)
POTASSIUM SERPL-SCNC: 4.6 MMOL/L — SIGNIFICANT CHANGE UP (ref 3.5–5.3)
RBC # BLD: 2.99 M/UL — LOW (ref 3.8–5.2)
RBC # BLD: 2.99 M/UL — LOW (ref 3.8–5.2)
RBC # FLD: 14.8 % — HIGH (ref 10.3–14.5)
RBC # FLD: 14.8 % — HIGH (ref 10.3–14.5)
SODIUM SERPL-SCNC: 142 MMOL/L — SIGNIFICANT CHANGE UP (ref 135–145)
SODIUM SERPL-SCNC: 142 MMOL/L — SIGNIFICANT CHANGE UP (ref 135–145)
WBC # BLD: 5.63 K/UL — SIGNIFICANT CHANGE UP (ref 3.8–10.5)
WBC # BLD: 5.63 K/UL — SIGNIFICANT CHANGE UP (ref 3.8–10.5)
WBC # FLD AUTO: 5.63 K/UL — SIGNIFICANT CHANGE UP (ref 3.8–10.5)
WBC # FLD AUTO: 5.63 K/UL — SIGNIFICANT CHANGE UP (ref 3.8–10.5)

## 2023-11-15 PROCEDURE — 83880 ASSAY OF NATRIURETIC PEPTIDE: CPT

## 2023-11-15 PROCEDURE — 84466 ASSAY OF TRANSFERRIN: CPT

## 2023-11-15 PROCEDURE — 82553 CREATINE MB FRACTION: CPT

## 2023-11-15 PROCEDURE — 96365 THER/PROPH/DIAG IV INF INIT: CPT

## 2023-11-15 PROCEDURE — 82607 VITAMIN B-12: CPT

## 2023-11-15 PROCEDURE — 84480 ASSAY TRIIODOTHYRONINE (T3): CPT

## 2023-11-15 PROCEDURE — 84300 ASSAY OF URINE SODIUM: CPT

## 2023-11-15 PROCEDURE — 85027 COMPLETE CBC AUTOMATED: CPT

## 2023-11-15 PROCEDURE — 85045 AUTOMATED RETICULOCYTE COUNT: CPT

## 2023-11-15 PROCEDURE — 82746 ASSAY OF FOLIC ACID SERUM: CPT

## 2023-11-15 PROCEDURE — 80053 COMPREHEN METABOLIC PANEL: CPT

## 2023-11-15 PROCEDURE — 84540 ASSAY OF URINE/UREA-N: CPT

## 2023-11-15 PROCEDURE — 82962 GLUCOSE BLOOD TEST: CPT

## 2023-11-15 PROCEDURE — 84484 ASSAY OF TROPONIN QUANT: CPT

## 2023-11-15 PROCEDURE — 94640 AIRWAY INHALATION TREATMENT: CPT

## 2023-11-15 PROCEDURE — 82570 ASSAY OF URINE CREATININE: CPT

## 2023-11-15 PROCEDURE — 84439 ASSAY OF FREE THYROXINE: CPT

## 2023-11-15 PROCEDURE — 93306 TTE W/DOPPLER COMPLETE: CPT

## 2023-11-15 PROCEDURE — 83550 IRON BINDING TEST: CPT

## 2023-11-15 PROCEDURE — 80061 LIPID PANEL: CPT

## 2023-11-15 PROCEDURE — 87040 BLOOD CULTURE FOR BACTERIA: CPT

## 2023-11-15 PROCEDURE — 71045 X-RAY EXAM CHEST 1 VIEW: CPT

## 2023-11-15 PROCEDURE — 99285 EMERGENCY DEPT VISIT HI MDM: CPT | Mod: 25

## 2023-11-15 PROCEDURE — 97161 PT EVAL LOW COMPLEX 20 MIN: CPT

## 2023-11-15 PROCEDURE — 84481 FREE ASSAY (FT-3): CPT

## 2023-11-15 PROCEDURE — 86901 BLOOD TYPING SEROLOGIC RH(D): CPT

## 2023-11-15 PROCEDURE — 82728 ASSAY OF FERRITIN: CPT

## 2023-11-15 PROCEDURE — 84436 ASSAY OF TOTAL THYROXINE: CPT

## 2023-11-15 PROCEDURE — 81001 URINALYSIS AUTO W/SCOPE: CPT

## 2023-11-15 PROCEDURE — 83540 ASSAY OF IRON: CPT

## 2023-11-15 PROCEDURE — 84100 ASSAY OF PHOSPHORUS: CPT

## 2023-11-15 PROCEDURE — 36415 COLL VENOUS BLD VENIPUNCTURE: CPT

## 2023-11-15 PROCEDURE — 83735 ASSAY OF MAGNESIUM: CPT

## 2023-11-15 PROCEDURE — 93005 ELECTROCARDIOGRAM TRACING: CPT

## 2023-11-15 PROCEDURE — 87186 SC STD MICRODIL/AGAR DIL: CPT

## 2023-11-15 PROCEDURE — 96375 TX/PRO/DX INJ NEW DRUG ADDON: CPT

## 2023-11-15 PROCEDURE — 82550 ASSAY OF CK (CPK): CPT

## 2023-11-15 PROCEDURE — 87086 URINE CULTURE/COLONY COUNT: CPT

## 2023-11-15 PROCEDURE — 83036 HEMOGLOBIN GLYCOSYLATED A1C: CPT

## 2023-11-15 PROCEDURE — 84443 ASSAY THYROID STIM HORMONE: CPT

## 2023-11-15 PROCEDURE — 86850 RBC ANTIBODY SCREEN: CPT

## 2023-11-15 PROCEDURE — 83615 LACTATE (LD) (LDH) ENZYME: CPT

## 2023-11-15 PROCEDURE — 85025 COMPLETE CBC W/AUTO DIFF WBC: CPT

## 2023-11-15 PROCEDURE — 86900 BLOOD TYPING SEROLOGIC ABO: CPT

## 2023-11-15 PROCEDURE — 80048 BASIC METABOLIC PNL TOTAL CA: CPT

## 2023-11-15 PROCEDURE — 71045 X-RAY EXAM CHEST 1 VIEW: CPT | Mod: 26

## 2023-11-15 PROCEDURE — 99239 HOSP IP/OBS DSCHRG MGMT >30: CPT

## 2023-11-15 RX ORDER — ACETAMINOPHEN 500 MG
2 TABLET ORAL
Qty: 0 | Refills: 0 | DISCHARGE

## 2023-11-15 RX ORDER — SPIRONOLACTONE 25 MG/1
1 TABLET, FILM COATED ORAL
Qty: 30 | Refills: 0
Start: 2023-11-15 | End: 2023-12-14

## 2023-11-15 RX ORDER — METOPROLOL TARTRATE 50 MG
1 TABLET ORAL
Qty: 30 | Refills: 0
Start: 2023-11-15 | End: 2023-12-14

## 2023-11-15 RX ADMIN — Medication 975 MILLIGRAM(S): at 16:27

## 2023-11-15 RX ADMIN — Medication 20 MILLIGRAM(S): at 05:41

## 2023-11-15 RX ADMIN — SPIRONOLACTONE 25 MILLIGRAM(S): 25 TABLET, FILM COATED ORAL at 05:41

## 2023-11-15 RX ADMIN — Medication 975 MILLIGRAM(S): at 14:40

## 2023-11-15 RX ADMIN — Medication 100 MILLIGRAM(S): at 05:41

## 2023-11-15 RX ADMIN — Medication 3 MILLILITER(S): at 05:41

## 2023-11-15 RX ADMIN — Medication 1 TABLET(S): at 10:47

## 2023-11-15 RX ADMIN — Medication 1 TABLET(S): at 05:41

## 2023-11-15 RX ADMIN — Medication 3 MILLILITER(S): at 15:58

## 2023-11-15 RX ADMIN — APIXABAN 2.5 MILLIGRAM(S): 2.5 TABLET, FILM COATED ORAL at 05:41

## 2023-11-15 RX ADMIN — Medication 3 MILLILITER(S): at 01:18

## 2023-11-15 RX ADMIN — Medication 3 MILLILITER(S): at 10:48

## 2023-11-15 RX ADMIN — Medication 25 MILLIGRAM(S): at 05:41

## 2023-11-15 NOTE — DISCHARGE NOTE NURSING/CASE MANAGEMENT/SOCIAL WORK - PATIENT PORTAL LINK FT
You can access the FollowMyHealth Patient Portal offered by [facility] by registering at the following website: [email or web address]. By joining [employer]’s FollowMyHealth portal, you will also be able to view your health information using other applications (apps) compatible with our system.

## 2023-11-15 NOTE — DISCHARGE NOTE NURSING/CASE MANAGEMENT/SOCIAL WORK - NSDCFUADDAPPT_GEN_ALL_CORE_FT
1. Follow up with [facility] as scheduled on 11/21/23 at 9 AM.  [address] [address]. Phone: [phone]

## 2023-11-16 LAB
T3 SERPL-MCNC: 67 NG/DL — SIGNIFICANT CHANGE UP
T3 SERPL-MCNC: 67 NG/DL — SIGNIFICANT CHANGE UP

## 2023-11-19 DIAGNOSIS — G51.0 BELL'S PALSY: ICD-10-CM

## 2023-11-19 DIAGNOSIS — I45.10 UNSPECIFIED RIGHT BUNDLE-BRANCH BLOCK: ICD-10-CM

## 2023-11-19 DIAGNOSIS — N76.4 ABSCESS OF VULVA: ICD-10-CM

## 2023-11-19 DIAGNOSIS — I48.19 OTHER PERSISTENT ATRIAL FIBRILLATION: ICD-10-CM

## 2023-11-19 DIAGNOSIS — N39.0 URINARY TRACT INFECTION, SITE NOT SPECIFIED: ICD-10-CM

## 2023-11-19 DIAGNOSIS — N75.0 CYST OF BARTHOLIN'S GLAND: ICD-10-CM

## 2023-11-19 DIAGNOSIS — J44.9 CHRONIC OBSTRUCTIVE PULMONARY DISEASE, UNSPECIFIED: ICD-10-CM

## 2023-11-19 DIAGNOSIS — E11.9 TYPE 2 DIABETES MELLITUS WITHOUT COMPLICATIONS: ICD-10-CM

## 2023-11-19 DIAGNOSIS — I27.20 PULMONARY HYPERTENSION, UNSPECIFIED: ICD-10-CM

## 2023-11-19 DIAGNOSIS — L97.929 NON-PRESSURE CHRONIC ULCER OF UNSPECIFIED PART OF LEFT LOWER LEG WITH UNSPECIFIED SEVERITY: ICD-10-CM

## 2023-11-19 DIAGNOSIS — R10.9 UNSPECIFIED ABDOMINAL PAIN: ICD-10-CM

## 2023-11-19 DIAGNOSIS — I11.0 HYPERTENSIVE HEART DISEASE WITH HEART FAILURE: ICD-10-CM

## 2023-11-19 DIAGNOSIS — N17.9 ACUTE KIDNEY FAILURE, UNSPECIFIED: ICD-10-CM

## 2023-11-19 DIAGNOSIS — J45.909 UNSPECIFIED ASTHMA, UNCOMPLICATED: ICD-10-CM

## 2023-11-19 DIAGNOSIS — I50.33 ACUTE ON CHRONIC DIASTOLIC (CONGESTIVE) HEART FAILURE: ICD-10-CM

## 2023-11-19 DIAGNOSIS — A04.72 ENTEROCOLITIS DUE TO CLOSTRIDIUM DIFFICILE, NOT SPECIFIED AS RECURRENT: ICD-10-CM

## 2023-11-19 DIAGNOSIS — Z79.01 LONG TERM (CURRENT) USE OF ANTICOAGULANTS: ICD-10-CM

## 2023-12-04 ENCOUNTER — APPOINTMENT (OUTPATIENT)
Dept: OBGYN | Facility: CLINIC | Age: 88
End: 2023-12-04